# Patient Record
(demographics unavailable — no encounter records)

---

## 2017-07-28 NOTE — RADIOLOGY REPORT (SQ)
EXAM DESCRIPTION:  CHEST SINGLE VIEW



COMPLETED DATE/TIME:  7/28/2017 1:02 pm



REASON FOR STUDY:  CP



COMPARISON:  8/25/2016.



NUMBER OF VIEWS:  One view.



TECHNIQUE:  Single frontal radiographic view of the chest acquired.



LIMITATIONS:  None.



FINDINGS:  LUNGS AND PLEURA: No opacities, masses or pneumothorax.  No pleural effusion.

MEDIASTINUM AND HILAR STRUCTURES: No masses.  Contour normal.

HEART AND VASCULAR STRUCTURES: Heart enlarged without failure.  Normal vasculature.

BONES: No acute findings.

HARDWARE: Defibrillator.  Sternotomy wires and coronary bypass markers.

OTHER: No other significant finding.



IMPRESSION:  HEART ENLARGED WITHOUT FAILURE.  NO CHANGE.



TECHNICAL DOCUMENTATION:  JOB ID:  [id number]

 2011 [employer]- All Rights Reserved

## 2017-07-28 NOTE — EKG REPORT
SEVERITY:- ABNORMAL ECG -

SINUS TACHYCARDIA

VENTRICULAR PREMATURE COMPLEX

AMAIRANI, CONSIDER BIATRIAL ABNORMALITIES

RBBB AND LPFB

INFERIOR INFARCT, AGE INDETERMINATE

:

Confirmed by: Gail Gutierrez MD 28-Jul-2017 18:57:54

## 2017-07-28 NOTE — PDOC H&P
History of Present Illness


Admission Date/PCP: 


  07/28/17 12:15





  PREETI THOMSON MD





History of Present Illness: 


Patient 64-year-old male with a known history of ischemic cardiomyopathy 

chronic chronic systolic heart failure, coronary artery disease status post 

multiple stents insertion, CABG AICD pacemaker placement he came to the office 

today because of chest pain and  shortness of breath.He has no medical 

insurance and is not very compliant with care, in the office a 12-lead EKG was 

done it showed sinus rhythm with old inferior wall MI, right bundle branch block

, there is no change from 2015 EKG he was admitted directly to the hospital for 

management





Past Medical History


Cardiac Medical History: Reports: Congestive Heart Failure, Coronary Artery 

Disease, Myocardial Infarction - six, Hyperlipidema, Hypertension


Pulmonary Medical History: Reports: Asthma, Bronchitis, Chronic Obstructive 

Pulmonary Disease (COPD), Pneumonia


Neurological Medical History: 


   Denies: Seizures


Endocrine Medical History: Reports: Diabetes Mellitus Type 2


GI Medical History: Reports: Gastroesophageal Reflux Disease


Musculoskeltal Medical History: 


   Denies: Arthritis


Psychiatric Medical History: 


   Denies: Depression


Hematology: 


   Denies: Anemia





Past Surgical History


Past Surgical History: Reports: Cardiac Catheterization, Coronary Artery Bypass 

Graft - x 3, Coronary Stent - x 8, Pacemaker, Tonsillectomy





Social History


Smoking Status: Never Smoker


Frequency of Alcohol Use: None


Hx Recreational Drug Use: No


Hx Prescription Drug Abuse: No





- Advance Directive


Resuscitation Status: Full Code





Family History


Family History: Reviewed & Not Pertinent.  denies: CAD


Parental Family History Reviewed: Yes


Children Family History Reviewed: Yes


Sibling(s) Family History Reviewed.: Yes





Medication/Allergy


Home Medications: 








Albuterol Sulfate [Albuterol Sulfate 2.5mg/3 mL] 1 vial IH Q4 PRN 07/28/17 


Aspirin [Aspirin EC] 81 mg PO DAILY 07/28/17 


Carvedilol [Coreg 25 mg Tablet] 25 mg PO Q12 07/28/17 


Lisinopril [Prinivil 40 mg Tablet] 40 mg PO DAILY 07/28/17 


Nitroglycerin [Nitrostat] 0.4 mg SL PRN PRN 07/28/17 


Ranolazine [Ranexa] 1,000 mg PO BID 07/28/17 


Ticagrelor [Brilinta 90 mg Tablet] 90 mg PO BID 07/28/17 








Allergies/Adverse Reactions: 


 





Iodinated Contrast- Oral and IV Dye [IV Dye, Iodine Containing] Allergy (

Verified 08/15/15 06:29)


 











Review of Systems


Constitutional: PRESENT: as per HPI


Cardiovascular: PRESENT: chest pain, dyspnea on exertion, edema


Respiratory: PRESENT: dyspnea


Gastrointestinal: PRESENT: nausea


Neurological: PRESENT: weakness





Physical Exam


Vital Signs: 


 











Temp Pulse Resp BP Pulse Ox


 


 98.3 F   69   16   122/75   98 


 


 07/28/17 19:52  07/28/17 21:00  07/28/17 19:52  07/28/17 21:00  07/28/17 19:52








 Intake & Output











 07/27/17 07/28/17 07/29/17





 06:59 06:59 06:59


 


Intake Total   300


 


Output Total   600


 


Balance   -300


 


Weight   110.4 kg











General appearance: PRESENT: severe distress


Eye exam: PRESENT: PERRLA


Respiratory exam: PRESENT: other - Diminished air entry


Cardiovascular exam: PRESENT: +S1, +S2


GI/Abdominal exam: PRESENT: soft


Neurological exam: PRESENT: alert, CN II-XII grossly intact





Results


Laboratory Results: 


 





 07/28/17 12:50 





 07/28/17 12:50 





 











  07/28/17 07/28/17 07/28/17





  12:50 12:50 12:50


 


WBC  4.6  


 


RBC  6.27 H  


 


Hgb  17.8 H  


 


Hct  53.8 H  


 


MCV  86  


 


MCH  28.4  


 


MCHC  33.1  


 


RDW  14.7 H  


 


Plt Count  215  


 


Seg Neutrophils %  35.8 L  


 


Lymphocytes %  48.7 H  


 


Monocytes %  7.5  


 


Eosinophils %  6.9 H  


 


Basophils %  1.1  


 


Absolute Neutrophils  1.7  


 


Absolute Lymphocytes  2.3  


 


Absolute Monocytes  0.3  


 


Absolute Eosinophils  0.3  


 


Absolute Basophils  0.0  


 


Carbonic Acid   


 


HCO3/H2CO3 Ratio   


 


ABG pH   


 


ABG pCO2   


 


ABG pO2   


 


ABG HCO3   


 


ABG O2 Saturation   


 


ABG Base Excess   


 


FiO2   


 


Sodium   140.0 


 


Potassium   4.5 


 


Chloride   107 


 


Carbon Dioxide   21 L 


 


Anion Gap   12 


 


BUN   12 


 


Creatinine   1.31 H 


 


Est GFR ( Amer)   > 60 


 


Est GFR (Non-Af Amer)   55 L 


 


Glucose   99 


 


Calcium   9.4 


 


Total Bilirubin   0.9 


 


AST   24 


 


ALT   32 


 


Alkaline Phosphatase   73 


 


Total Protein   8.5 H 


 


Albumin   4.7 


 


TSH    2.40


 


Free T4    1.18














  07/28/17





  13:17


 


WBC 


 


RBC 


 


Hgb 


 


Hct 


 


MCV 


 


MCH 


 


MCHC 


 


RDW 


 


Plt Count 


 


Seg Neutrophils % 


 


Lymphocytes % 


 


Monocytes % 


 


Eosinophils % 


 


Basophils % 


 


Absolute Neutrophils 


 


Absolute Lymphocytes 


 


Absolute Monocytes 


 


Absolute Eosinophils 


 


Absolute Basophils 


 


Carbonic Acid  1.08


 


HCO3/H2CO3 Ratio  20:1


 


ABG pH  7.40


 


ABG pCO2  35.9


 


ABG pO2  100.8 H


 


ABG HCO3  21.8


 


ABG O2 Saturation  97.7


 


ABG Base Excess  -2.1


 


FiO2  4L NC


 


Sodium 


 


Potassium 


 


Chloride 


 


Carbon Dioxide 


 


Anion Gap 


 


BUN 


 


Creatinine 


 


Est GFR ( Amer) 


 


Est GFR (Non-Af Amer) 


 


Glucose 


 


Calcium 


 


Total Bilirubin 


 


AST 


 


ALT 


 


Alkaline Phosphatase 


 


Total Protein 


 


Albumin 


 


TSH 


 


Free T4 








 











  07/28/17 07/28/17 07/28/17





  12:50 12:50 12:50


 


Creatine Kinase  192 H  188 H 


 


CK-MB (CK-2)    2.83


 


Troponin I    0.027














  07/28/17 07/28/17





  20:30 20:30


 


Creatine Kinase  147 


 


CK-MB (CK-2)   2.75


 


Troponin I   0.131











Impressions: 


 





Chest X-Ray  07/28/17 00:00


IMPRESSION:  HEART ENLARGED WITHOUT FAILURE.  NO CHANGE.


 














Assessment & Plan





- Diagnosis


(1) Hypertensive emergency


Plan: 


The blood pressure is severely elevated with evidence of end organ failure, he 

will be started on IV nitroglycerin infusion








(2) Chest pain


Qualifiers: 


     Chest pain type: unspecified     Qualified Code(s): R07.9 - Chest pain, 

unspecified  


Is this a current diagnosis for this admission?: Yes





(3) Coronary artery disease


Qualifiers: 


     Coronary Disease-Associated Artery/Lesion type: unspecified vessel or 

lesion type     Associated angina: angina presence unspecified


Is this a current diagnosis for this admission?: Yes





(4) Type 2 diabetes mellitus


Qualifiers: 


     Diabetes mellitus complication status: with unspecified complications     

Diabetes mellitus long term insulin use: without long term use        Qualified 

Code(s): E11.8 - Type 2 diabetes mellitus with unspecified complications; Z79.4 

- Long term (current) use of insulin  


Is this a current diagnosis for this admission?: Yes

## 2017-07-28 NOTE — XCELERA REPORT
[address] [address]

                             Tel: [phone]

                             Fax: [phone]



                    Transthoracic Echocardiogram Report

____________________________________________________________________________



Name: FRIDA YAP JR

MRN: [id number]                Age: 64 yrs

Gender: Male                   : 1953

Patient Status: Inpatient      Patient Location: 3S\S\336\S\A

Account #: [id number]

Study Date: 2017 03:16 PM

Accession #: [id number]

____________________________________________________________________________



Height: 73 in        Weight: 243 lb        BSA: 2.3 m2



____________________________________________________________________________

Procedure: A complete two-dimensional transthoracic echocardiogram was

performed (2D, M-mode, spectral and color flow Doppler). The study was

technically difficult with many images being suboptimal in quality.

Reason For Study: CP, severe HTN





Ordering Physician: PATRICE ISBELL

Performed By: Alda Cortes

____________________________________________________________________________





Interpretation Summary

The Ejection Fraction estimate is 25-30%

Left ventricular systolic function is severely reduced.

There is moderate concentric left ventricular hypertrophy.

The left ventricle is mildly dilated.

Doppler measurements suggest pseudonormalized left ventricular relaxation,

which is associated with grade II/IV or mild to moderate diastolic

dysfunction

There is severe global hypokinesis of the left ventricle.

There is mid to distal anterior wall akinesis

There is apical wall akinesis

The right atrium is mildly dilated.

The left atrium is severely dilated.

There is a trace amount of mitral regurgitation

There is no mitral valve stenosis.

No aortic regurgitation is present.

There is no aortic valve stenosis

There is a trace or physiologic amount of tricuspid regurgitation

Tricuspid regurgitation jet envelope not well defined to measure RV

systolic pressure accurately.

The aortic root is not well visualized but is probably normal size.

The inferior vena cava appeared normal and decreased > 50% with respiration

(RAP 5-10 mmHg)

There is no pericardial effusion.



____________________________________________________________________________



MMode/2D Measurements \T\ Calculations

RVDd: 3.1 cm     LVIDd: 6.1 cm       FS: 7.3 %         Ao root diam:

IVSd: 1.5 cm     LVIDs: 5.7 cm       EDV(Teich):       3.1 cm

                 LVPWd: 1.3 cm       188.6 ml          Ao root area:

                                     ESV(Teich):

                                     158.4 ml          7.5 cm2

                                     EF(Teich): 16.0 % LA dimension:

                                                       5.3 cm

        _____________________________________________________________

LVOT diam:       LVLd ap4: 10.7 cm   SV(MOD-sp4):

2.4 cm           EDV(MOD-sp4):       77.0 ml

LVOT area:       240.0 ml

                 LVLs ap4: 9.9 cm

4.6 cm2          ESV(MOD-sp4):

                 163.0 ml

                 EF(MOD-sp4): 32.1 %





Doppler Measurements \T\ Calculations

MV E max flor:      MV P1/2t max flor:    Ao V2 max:        LV V1 max P.2 cm/sec        80.6 cm/sec          129.0 cm/sec      1.8 mmHg

MV A max flor:      MV P1/2t: 45.6 msec  Ao max PG:        LV V1 max:

38.9 cm/sec        MVA(P1/2t): 4.8 cm2  6.7 mmHg          67.5 cm/sec

MV E/A: 2.1        MV dec slope:        RUY(V,D): 2.4 cm2

                   517.7 cm/sec2



        _____________________________________________________________

PA V2 max:

73.1 cm/sec

PA max P.1 mmHg



____________________________________________________________________________

Left Ventricle

The left ventricle is mildly dilated. There is moderate concentric left

ventricular hypertrophy. Left ventricular systolic function is severely

reduced. The Ejection Fraction estimate is 25-30%. Doppler measurements

suggest pseudonormalized left ventricular relaxation, which is associated

with grade II/IV or mild to moderate diastolic dysfunction. There is severe

global hypokinesis of the left ventricle. There is mid to distal anterior

wall akinesis. There is apical wall akinesis.



Right Ventricle

The right ventricle is grossly normal size. There is normal right

ventricular wall thickness. The right ventricular systolic function is

normal.



Atria

The right atrium is mildly dilated. The left atrium is severely dilated.





Mitral Valve

The mitral valve is grossly normal. There is no mitral valve stenosis.

There is a trace amount of mitral regurgitation.



Aortic Valve

The aortic valve is grossly normal. There is no aortic valve stenosis. No

aortic regurgitation is present.



Tricuspid Valve

The tricuspid valve is not well visualized, but is grossly normal. There is

no tricuspid stenosis. There is a trace or physiologic amount of tricuspid

regurgitation. Tricuspid regurgitation jet envelope not well defined to

measure RV systolic pressure accurately.



Pulmonic Valve

The pulmonic valve is not well visualized.



Great Vessels

The aortic root is not well visualized but is probably normal size. The

inferior vena cava appeared normal and decreased > 50% with respiration

(RAP 5-10 mmHg).





Effusions

There is no pericardial effusion.



Incidental Findings

Pacemaker wire noted.



____________________________________________________________________________



Electronically signed by:      Patrice Isbell      on 2017 07:34 PM



CC: PATRICE ISBELL

>

Patrice Isbell

## 2017-07-29 NOTE — PDOC PROGRESS REPORT
Subjective


Progress Note for:: 07/29/17


Subjective:: 


Patient seems to be doing better with gradual improvement.  Pt is denying any 

chest arm or neck discomfort.  Patient denying any PND, orthopnea.  Patient 

denied any sustained palpitations, dizziness, syncope, near syncope.  Patient 

denying any fever chills.  Patient denying any other significant discomfort.  





Patient is maintaining sinus rhythm with ventricular paced beats.





Review of systems: Rest review of systems negative.





Medications: Medications have been reviewed.





Physical Exam


Vital Signs: 


 











Temp Pulse Resp BP Pulse Ox


 


 98.0 F   79   16   122/79   100 


 


 07/29/17 08:11  07/29/17 08:11  07/29/17 08:11  07/29/17 08:11  07/29/17 08:11








 Intake & Output











 07/28/17 07/29/17 07/30/17





 06:59 06:59 06:59


 


Intake Total  927 


 


Output Total  600 


 


Balance  327 


 


Weight  109.3 kg 














Results


Laboratory Results: 


 





 07/28/17 12:50 





 07/28/17 12:50 





 











  07/28/17 07/28/17 07/28/17





  12:50 12:50 12:50


 


Creatine Kinase  192 H  188 H 


 


CK-MB (CK-2)    2.83


 


Troponin I    0.027














  07/28/17 07/28/17 07/29/17





  20:30 20:30 05:00


 


Creatine Kinase  147   159


 


CK-MB (CK-2)   2.75 


 


Troponin I   0.131 














  07/29/17





  05:00


 


Creatine Kinase 


 


CK-MB (CK-2)  2.48


 


Troponin I  0.098











EKG Comments: 


Showed sinus rhythm with right bundle branch block pattern, evidence of prior 

inferior myocardial infarction occasional VPCs noted


Impressions: 


 





Chest X-Ray  07/28/17 00:00


IMPRESSION:  HEART ENLARGED WITHOUT FAILURE.  NO CHANGE.


 














Assessment & Plan





- Diagnosis


(1) Hypertensive emergency


Is this a current diagnosis for this admission?: Yes





(2) Chest pain


Qualifiers: 


     Chest pain type: unspecified     Qualified Code(s): R07.9 - Chest pain, 

unspecified  


Is this a current diagnosis for this admission?: Yes





(3) Coronary artery disease


Qualifiers: 


     Coronary Disease-Associated Artery/Lesion type: unspecified vessel or 

lesion type     Associated angina: angina presence unspecified


Is this a current diagnosis for this admission?: Yes





(4) Type 2 diabetes mellitus


Qualifiers: 


     Diabetes mellitus complication status: with unspecified complications     

Diabetes mellitus long term insulin use: without long term use        Qualified 

Code(s): E11.8 - Type 2 diabetes mellitus with unspecified complications; Z79.4 

- Long term (current) use of insulin  


Is this a current diagnosis for this admission?: Yes





(5) Cardiac defibrillator in situ


Is this a current diagnosis for this admission?: Yes





(6) Congestive heart failure


Qualifiers: 


     Congestive heart failure type: combined     Congestive heart failure 

chronicity: acute on chronic        Qualified Code(s): I50.43 - Acute on 

chronic combined systolic (congestive) and diastolic (congestive) heart failure

  


Is this a current diagnosis for this admission?: Yes








- Notes


Notes: 


Hypertensive emergency: Patient was noted to have severe hypertension on 

presentation along with precipitation of chest pain.  Currently blood pressure 

under satisfactory control.


Coronary artery disease: Patient has significant CAD and is status post CABG 

and also multiple stent placement.  Patient's medical regimen will be 

optimized.  Patient will benefit from a nuclear stress test prior to discharge 

and this will be scheduled.  This is needed because of presentation with chest 

pain and subsequent positive cardiac enzymes elevation.


Congestive heart failure: This seems clinically compensated by exam.  This is 

related to systolic plus diastolic dysfunction.  Echocardiogram shows systolic 

dysfunction as well as diastolic dysfunction without any significant valvular 

abnormalities..  Will recommend entresto therapy, beta-blocker therapy and 

diuretics therapy.  Patient will also benefit from salt and fluid restriction.  

Entresto therapy not to be started at this time as there is some concern about 

affordability by the patient.


Status post defibrillator placement: Patient claims defibrillator was placed in 

[place].  Last ICD battery change out was 3 years ago.  Currently 

defibrillator seems to be functioning normally.


Chest pain: Currently chest pain-free.  Patient subsequently noted to have high 

troponin I.  Patient will benefit from a ischemia workup.  Chest pain probably 

precipitated by severe hypertension.


Non-STEMI: Based on enzyme elevation and symptoms of chest discomfort patient 

seems to have sustained non-STEMI.  Could have been precipitated by severe 

hypertension.


Diabetes: Recommend good control of blood sugar.  However should avoid any 

hypoglycemia.  Patient being expertly managed by primary care M.D.





- Time


Time with patient: Greater than 35 minutes - CODE STATUS was discussed, patient 

remains full code.  Surrogate decision-maker unchanged.  Multiple medical 

problems were addressed.  More than 50% of the time spent coordinating care, 

discussing management plans with involved caregivers.  Management plans 

discussed with involved personnels.  Medical decision making was of moderate to 

high complexity, patient's has multiple  comorbidities.


Medications reviewed and adjusted accordingly: Yes

## 2017-07-29 NOTE — PDOC CONSULTATION
Consultation


Consult Date: 07/28/17


Attending physician:: PREETI THOMSON


Consult reason:: Chest discomfort and shortness of breath





History of Present Illness


Admission Date/PCP: 


  07/28/17 12:15





  PREETI THOMSON MD





Patient complains of: Shortness of breath and chest discomfort


History of Present Illness: 


Deidre is a 63-year-old male with past medical history of coronary artery 

disease and ischemic cardiomyopathy as well as hypertension, diabetes, and 

hyperlipidemia who presents with several hours of left-sided chest pain.  He 

described as a dull, throbbing, aching pain.  It was improved and eventually 

relieved by multiple doses of nitroglycerin.  Nothing was noted to worsen the 

pain when it was present.  He was admitted directly from the office primary 

care MD with above complaint.  He was also noted to have severe hypertension 

with systolic over 200 mmHg.  Patient was started on nitroglycerin sublingual 

and subsequently on nitroglycerin drip.  I entered multiple antihypertensive 

medication to be given.  Please see orders.  He notes associated dyspnea as 

well as nausea without vomiting. 








Past Medical History


Cardiac Medical History: Reports: Congestive Heart Failure, Coronary Artery 

Disease, Myocardial Infarction - six, Hyperlipidema, Hypertension


Pulmonary Medical History: Reports: Asthma, Bronchitis, Chronic Obstructive 

Pulmonary Disease (COPD), Pneumonia


Neurological Medical History: 


   Denies: Seizures


Endocrine Medical History: Reports: Diabetes Mellitus Type 2


GI Medical History: Reports: Gastroesophageal Reflux Disease


Musculoskeltal Medical History: 


   Denies: Arthritis


Psychiatric Medical History: 


   Denies: Depression


Hematology: 


   Denies: Anemia





Past Surgical History


Past Surgical History: Reports: Cardiac Catheterization, Coronary Artery Bypass 

Graft - x 3, Coronary Stent - x 8, Pacemaker, Tonsillectomy





Social History


Information Source: Patient


Smoking Status: Never Smoker


Frequency of Alcohol Use: None


Hx Recreational Drug Use: No


Hx Prescription Drug Abuse: No





- Advance Directive


Resuscitation Status: Full Code


Surrogate healthcare decision maker:: 


Patient spouse is the surrogate decision-maker





Family History


Family History: CAD, Hypertension


Parental Family History Reviewed: Yes


Children Family History Reviewed: Yes


Sibling(s) Family History Reviewed.: Yes





Medication/Allergy


Home Medications: 








Albuterol Sulfate [Albuterol Sulfate 2.5mg/3 mL] 1 vial IH Q4 PRN 07/28/17 


Aspirin [Aspirin EC] 81 mg PO DAILY 07/28/17 


Carvedilol [Coreg 25 mg Tablet] 25 mg PO Q12 07/28/17 


Lisinopril [Prinivil 40 mg Tablet] 40 mg PO DAILY 07/28/17 


Nitroglycerin [Nitrostat] 0.4 mg SL PRN PRN 07/28/17 


Ranolazine [Ranexa] 1,000 mg PO BID 07/28/17 


Ticagrelor [Brilinta 90 mg Tablet] 90 mg PO BID 07/28/17 








Allergies/Adverse Reactions: 


 





Iodinated Contrast- Oral and IV Dye [IV Dye, Iodine Containing] Allergy (

Verified 08/15/15 06:29)


 











Review of Systems


Review of Systems: 


Please see history of present illness and past medical history as wall.


Constitutional: No fever or chills reported.


Head : No recent chronic headaches, recent head injury.


Eyes: No recent eye pain, diplopia, redness, discharge, acute visual changes.


Ears: No recent chronic ear pain, acute hearing loss, ear discharge.


Oral cavity: No recent  ulcerations, bleeding, oral cavity discomfort.


Neck: No recent acute neck pain reported.


Hematologic: No recent easy bruising or bleeding or hematologic malignancy 

reported.


Lymphatic: No recent lymphatic malignancy, chronic lymphadenopathy reported yet


Cardiovascular system review: See history of present illness.


Respiratory system review: No recent chronic cough, hemoptysis, blood clots in 

the lungs reported. Mild Shortness of breath on exertion


Gastrointestinal system review: Negative for any recent acute or chronic 

abdominal pain, hematemesis, melena, recent change in bowel habits.  


Genitourinary system review: No recent acute or chronic hematuria, flank pain, 

UTI etc. reported.


Skin system review: Negative for any recent abnormal bruising, no rash, no 

pruritus reported.


Neurologic: No prior history of strokes, mini strokes, seizure disorder.


Psychologic: No history of major psychosis or major depression reported.


Musculoskeletal: Minor aches and pains reported.  No acute joint swelling 

reported.


Endocrine: No recent polyuria, polydipsia, recent heat or cold intolerance.





Physical Exam


Vital Signs: 


 











Temp Pulse Resp BP Pulse Ox


 


 98.2 F   76   22 H  177/105 H  98 


 


 07/28/17 14:00  07/28/17 16:18  07/28/17 14:00  07/28/17 16:18  07/28/17 14:00








 Intake & Output











 07/27/17 07/28/17 07/29/17





 06:59 06:59 06:59


 


Intake Total   300


 


Output Total   600


 


Balance   -300


 


Weight   110.4 kg











Exam: 





GENERAL: well-nourished and in no acute distress.  Alert and oriented x3


HEAD: Atraumatic, normocephalic.


EYES: Pupils equal round and reactive to light, extraocular movements intact, 

sclera anicteric, conjunctiva are normal.


ENT: TMs normal, nares patent, oropharynx clear without exudates. Moist mucous 

membranes.  No oral ulcerations or bleeding gums noted


NECK: supple without lymphadenopathy.  Trachea is central.  No cervical or 

axillary lymphadenopathy noted.  Carotids are 2+, JVD WNL 


LUNGS: Respiration seems nonlabored, no significant accessory muscle action 

noted.  Bilateral fine basal crackles noted. No wheezes rales or rhonchi noted.

  No significant dullness noted on percussion.


CHEST: Palpation of the chest wall shows no significant chest wall tenderness.  

No other significant abnormalities noted.


HEART: [place] NP, No PSH,  1/6 ROSA aortic area, 1/6 pan systolic murmur mitral 

area, no rubs, positive S3 gallops.


ABDOMEN: Soft, no significant tenderness appreciated, normoactive bowel sounds. 

No guarding, no rebound.  No rigidity noted . No masses appreciated.


EXTREMITIES: Pedal pulses are 1-2+, no calf tenderness noted. No clubbing or 

cyanosis.trace to 1+ pedal edema noted


NEUROLOGICAL: Focused neurological exam showed no significant neurologic 

deficit. Normal speech, no focal weakness appreciated. 


PSYCH: Normal mood, normal affect.  Judgment and insight within normal limits.


SKIN: No significant ecchymosis, rash, ulcerations or signs of pruritus noted.


MUSCULOSKELETAL EXAM: No significant joint swelling noted.





Results


Laboratory Results: 


 





 07/28/17 12:50 





 07/28/17 12:50 





 











  07/28/17 07/28/17 07/28/17





  12:50 12:50 12:50


 


WBC  4.6  


 


RBC  6.27 H  


 


Hgb  17.8 H  


 


Hct  53.8 H  


 


MCV  86  


 


MCH  28.4  


 


MCHC  33.1  


 


RDW  14.7 H  


 


Plt Count  215  


 


Seg Neutrophils %  35.8 L  


 


Lymphocytes %  48.7 H  


 


Monocytes %  7.5  


 


Eosinophils %  6.9 H  


 


Basophils %  1.1  


 


Absolute Neutrophils  1.7  


 


Absolute Lymphocytes  2.3  


 


Absolute Monocytes  0.3  


 


Absolute Eosinophils  0.3  


 


Absolute Basophils  0.0  


 


Carbonic Acid   


 


HCO3/H2CO3 Ratio   


 


ABG pH   


 


ABG pCO2   


 


ABG pO2   


 


ABG HCO3   


 


ABG O2 Saturation   


 


ABG Base Excess   


 


FiO2   


 


Sodium   140.0 


 


Potassium   4.5 


 


Chloride   107 


 


Carbon Dioxide   21 L 


 


Anion Gap   12 


 


BUN   12 


 


Creatinine   1.31 H 


 


Est GFR ( Amer)   > 60 


 


Est GFR (Non-Af Amer)   55 L 


 


Glucose   99 


 


Calcium   9.4 


 


Total Bilirubin   0.9 


 


AST   24 


 


ALT   32 


 


Alkaline Phosphatase   73 


 


Total Protein   8.5 H 


 


Albumin   4.7 


 


TSH    2.40


 


Free T4    1.18














  07/28/17





  13:17


 


WBC 


 


RBC 


 


Hgb 


 


Hct 


 


MCV 


 


MCH 


 


MCHC 


 


RDW 


 


Plt Count 


 


Seg Neutrophils % 


 


Lymphocytes % 


 


Monocytes % 


 


Eosinophils % 


 


Basophils % 


 


Absolute Neutrophils 


 


Absolute Lymphocytes 


 


Absolute Monocytes 


 


Absolute Eosinophils 


 


Absolute Basophils 


 


Carbonic Acid  1.08


 


HCO3/H2CO3 Ratio  20:1


 


ABG pH  7.40


 


ABG pCO2  35.9


 


ABG pO2  100.8 H


 


ABG HCO3  21.8


 


ABG O2 Saturation  97.7


 


ABG Base Excess  -2.1


 


FiO2  4L NC


 


Sodium 


 


Potassium 


 


Chloride 


 


Carbon Dioxide 


 


Anion Gap 


 


BUN 


 


Creatinine 


 


Est GFR ( Amer) 


 


Est GFR (Non-Af Amer) 


 


Glucose 


 


Calcium 


 


Total Bilirubin 


 


AST 


 


ALT 


 


Alkaline Phosphatase 


 


Total Protein 


 


Albumin 


 


TSH 


 


Free T4 








 











  07/28/17 07/28/17 07/28/17





  12:50 12:50 12:50


 


Creatine Kinase  192 H  188 H 


 


CK-MB (CK-2)    2.83


 


Troponin I    0.027











EKG Comments: 


Twelve-lead EKG shows sinus rhythm, right bundle branch block pattern, inferior 

Q waves suggestive of prior inferior myocardial infarction and occasional VPCs.

  EKG is felt to be relatively unchanged.


Impressions: 


 





Chest X-Ray  07/28/17 00:00


IMPRESSION:  HEART ENLARGED WITHOUT FAILURE.  NO CHANGE.


 














Assessment & Plan





- Diagnosis


(1) Hypertensive emergency


Is this a current diagnosis for this admission?: Yes





(2) Chest pain


Qualifiers: 


     Chest pain type: unspecified     Qualified Code(s): R07.9 - Chest pain, 

unspecified  


Is this a current diagnosis for this admission?: Yes





(3) Coronary artery disease


Qualifiers: 


     Coronary Disease-Associated Artery/Lesion type: unspecified vessel or 

lesion type     Associated angina: angina presence unspecified


Is this a current diagnosis for this admission?: Yes





(4) Type 2 diabetes mellitus


Qualifiers: 


     Diabetes mellitus complication status: with unspecified complications     

Diabetes mellitus long term insulin use: without long term use        Qualified 

Code(s): E11.8 - Type 2 diabetes mellitus with unspecified complications; Z79.4 

- Long term (current) use of insulin  


Is this a current diagnosis for this admission?: Yes





(5) Cardiac defibrillator in situ


Is this a current diagnosis for this admission?: Yes





(6) Congestive heart failure


Qualifiers: 


     Congestive heart failure type: combined     Congestive heart failure 

chronicity: acute on chronic        Qualified Code(s): I50.43 - Acute on 

chronic combined systolic (congestive) and diastolic (congestive) heart failure

  


Is this a current diagnosis for this admission?: Yes








- Notes


Notes: 


Hypertensive emergency: Patient was noted to have severe hypertension along 

with precipitation of chest pain.  Patient placed on nitroglycerin drip.  

Multiple antihypertensives added.  After several hours, blood pressure came to 

the normal range.  Nitroglycerin drip was ordered to be discontinued.


Coronary artery disease: Patient has significant CAD and is status post CABG 

and also multiple stent placement.  Patient's medical regimen will be 

optimized.  Patient will benefit from a nuclear stress test prior to discharge 

and this will be scheduled.  This is needed because of presentation with chest 

pain and subsequent positive cardiac enzymes elevation.


Congestive heart failure: This is related to systolic plus diastolic 

dysfunction.  A stat echo performed was reviewed.  Will recommend entresto 

therapy, beta-blocker therapy and diuretics therapy.  Patient will also benefit 

from salt and fluid restriction.


Status post defibrillator placement: Patient claims defibrillator was placed in 

[place].  Last ICD battery change out was 3 years ago.  Currently 

defibrillator seems to be functioning normally.


Chest pain: Patient subsequently noted to have high troponin I.  Patient will 

benefit from a ischemia workup.  Chest pain probably precipitated by severe 

hypertension.


Non-STEMI: Based on enzyme elevation and symptoms of chest discomfort patient 

seems to have sustained non-STEMI.  Could have been precipitated by severe 

hypertension.


Diabetes: Recommend good control of blood sugar.  However should avoid any 

hypoglycemia.  Patient being expertly managed by primary care M.D.





- Time


Time Spent: 50 to 70 Minutes - CODE STATUS was discussed, patient remains full 

code.  Surrogate decision-maker unchanged.  Multiple medical problems were 

addressed.  More than 50% of the time spent coordinating care, discussing 

management plans with involved caregivers.  Management plans discussed with 

involved personnels.  Medical decision making was of moderate to high complexity

, patient's has multiple  comorbidities.


Medications reviewed and adjusted accordingly: Yes

## 2017-07-29 NOTE — PDOC PROGRESS REPORT
Subjective


Progress Note for:: 07/29/17


Subjective:: 


No chest pain or difficulty with breathing. No dizziness, headache, nausea or 

vomiting. No fever or chills.





Physical Exam


Vital Signs: 


 











Temp Pulse Resp BP Pulse Ox


 


 98.0 F   79   16   122/79   100 


 


 07/29/17 08:11  07/29/17 08:11  07/29/17 08:11  07/29/17 08:11  07/29/17 08:11








 Intake & Output











 07/28/17 07/29/17 07/30/17





 06:59 06:59 06:59


 


Intake Total  927 


 


Output Total  600 


 


Balance  327 


 


Weight  109.3 kg 











General appearance: PRESENT: no acute distress, cooperative


Head exam: PRESENT: atraumatic, normocephalic


Eye exam: PRESENT: EOMI, PERRLA


Mouth exam: PRESENT: moist


Neck exam: PRESENT: full ROM.  ABSENT: carotid bruit, JVD, lymphadenopathy, 

thyromegaly


Respiratory exam: PRESENT: clear to auscultation nanda


Cardiovascular exam: PRESENT: RRR.  ABSENT: diastolic murmur, rubs, systolic 

murmur


GI/Abdominal exam: PRESENT: normal bowel sounds, soft.  ABSENT: distended, 

guarding, mass, organolmegaly, rebound, tenderness


Extremities exam: ABSENT: pedal edema


Musculoskeletal exam: PRESENT: deformity - related to joint arthritis 

involvement


Neurological exam: PRESENT: alert, awake, oriented to person, oriented to place

, oriented to time, oriented to situation, CN II-XII grossly intact.  ABSENT: 

motor sensory deficit


Psychiatric exam: PRESENT: appropriate affect, normal mood.  ABSENT: homicidal 

ideation, suicidal ideation


Skin exam: PRESENT: dry, intact, warm.  ABSENT: cyanosis, rash





Results


Laboratory Results: 


 





 07/28/17 12:50 





 07/28/17 12:50 





 











  07/28/17 07/28/17 07/28/17





  12:50 12:50 13:17


 


WBC  4.6  


 


RBC  6.27 H  


 


Hgb  17.8 H  


 


Hct  53.8 H  


 


MCV  86  


 


MCH  28.4  


 


MCHC  33.1  


 


RDW  14.7 H  


 


Plt Count  215  


 


Seg Neutrophils %  35.8 L  


 


Lymphocytes %  48.7 H  


 


Monocytes %  7.5  


 


Eosinophils %  6.9 H  


 


Basophils %  1.1  


 


Absolute Neutrophils  1.7  


 


Absolute Lymphocytes  2.3  


 


Absolute Monocytes  0.3  


 


Absolute Eosinophils  0.3  


 


Absolute Basophils  0.0  


 


Carbonic Acid    1.08


 


HCO3/H2CO3 Ratio    20:1


 


ABG pH    7.40


 


ABG pCO2    35.9


 


ABG pO2    100.8 H


 


ABG HCO3    21.8


 


ABG O2 Saturation    97.7


 


ABG Base Excess    -2.1


 


FiO2    4L NC


 


TSH   2.40 


 


Free T4   1.18 








 











  07/28/17 07/28/17 07/28/17





  12:50 12:50 12:50


 


Creatine Kinase  192 H  188 H 


 


CK-MB (CK-2)    2.83


 


Troponin I    0.027














  07/28/17 07/28/17 07/29/17





  20:30 20:30 05:00


 


Creatine Kinase  147   159


 


CK-MB (CK-2)   2.75 


 


Troponin I   0.131 














  07/29/17





  05:00


 


Creatine Kinase 


 


CK-MB (CK-2)  2.48


 


Troponin I  0.098











Impressions: 


 





Chest X-Ray  07/28/17 00:00


IMPRESSION:  HEART ENLARGED WITHOUT FAILURE.  NO CHANGE.


 














Assessment & Plan





- Diagnosis


(1) Chest pain


Qualifiers: 


     Chest pain type: unspecified     Qualified Code(s): R07.9 - Chest pain, 

unspecified  


Is this a current diagnosis for this admission?: YesPlan: 


See covering attending physician orders.








(2) Hypertensive emergency


Is this a current diagnosis for this admission?: YesPlan: 


See covering attending physician orders.








(3) Coronary artery disease


Qualifiers: 


     Coronary Disease-Associated Artery/Lesion type: unspecified vessel or 

lesion type     Associated angina: angina presence unspecified


Is this a current diagnosis for this admission?: YesPlan: 


See covering attending physician orders.








(4) Type 2 diabetes mellitus


Qualifiers: 


     Diabetes mellitus complication status: with unspecified complications     

Diabetes mellitus long term insulin use: without long term use        Qualified 

Code(s): E11.8 - Type 2 diabetes mellitus with unspecified complications  


Is this a current diagnosis for this admission?: YesPlan: 


See covering attending physician orders.











- Time


Time Spent with patient: 25-34 minutes


Medications reviewed and adjusted accordingly: Yes


Anticipated discharge: Home


Within: Other





- Inpatient Certification


Medical Necessity: Need Close Monitoring Due to Risk of Patient Decompensation, 

Need For IV Fluids, Need For Continuous Telemetry Monitoring, Risk of 

Complication if Not Cared For in Hospital


Post Hospital Care: D/C Planner Documentation





- Plan Summary


Plan Summary: 


See covering attending physician orders.

## 2017-07-30 NOTE — PDOC PROGRESS REPORT
Subjective


Progress Note for:: 07/30/17


Subjective:: 


No chest pain or difficulty with breathing. No dizziness, headache, nausea or 

vomiting. No fever or chills.





Physical Exam


Vital Signs: 


 











Temp Pulse Resp BP Pulse Ox


 


 98.0 F   63   16   139/73 H  100 


 


 07/30/17 11:40  07/30/17 11:40  07/30/17 11:40  07/30/17 11:40  07/30/17 11:40








 Intake & Output











 07/29/17 07/30/17 07/31/17





 06:59 06:59 06:59


 


Intake Total 927 1080 350


 


Output Total 600  


 


Balance 327 1080 350


 


Weight 109.3 kg 107.7 kg 











Physical Exam: 


General appearance: PRESENT: no acute distress, cooperative


Head exam: PRESENT: atraumatic, normocephalic


Eye exam: PRESENT: EOMI, PERRLA


Mouth exam: PRESENT: moist


Neck exam: PRESENT: full ROM.  ABSENT: carotid bruit, JVD, lymphadenopathy, 

thyromegaly


Respiratory exam: PRESENT: clear to auscultation nanda


Cardiovascular exam: PRESENT: RRR.  ABSENT: diastolic murmur, rubs, systolic 

murmur


GI/Abdominal exam: PRESENT: normal bowel sounds, soft.  ABSENT: distended, 

guarding, mass, organomegaly, rebound, tenderness


Extremities exam: ABSENT: pedal edema


Musculoskeletal exam: PRESENT: deformity - related to joint arthritis 

involvement


Neurological exam: PRESENT: alert, awake, oriented to person, oriented to place

, oriented to time, oriented to situation, CN II-XII grossly intact.  ABSENT: 

motor sensory deficit


Psychiatric exam: PRESENT: appropriate affect, normal mood.  ABSENT: homicidal 

ideation, suicidal ideation


Skin exam: PRESENT: dry, intact, warm.  ABSENT: cyanosis, rash








Results


Laboratory Results: 


 





 07/30/17 06:02 





 07/30/17 06:02 





 











  07/30/17 07/30/17





  06:02 06:02


 


WBC  5.1 


 


RBC  5.69 H 


 


Hgb  16.3 


 


Hct  49.3 


 


MCV  87 


 


MCH  28.7 


 


MCHC  33.1 


 


RDW  14.5 H 


 


Plt Count  164 


 


Seg Neutrophils %  53.6 


 


Lymphocytes %  29.4 


 


Monocytes %  11.0 


 


Eosinophils %  4.8 


 


Basophils %  1.2 


 


Absolute Neutrophils  2.7 


 


Absolute Lymphocytes  1.5 


 


Absolute Monocytes  0.6 


 


Absolute Eosinophils  0.2 


 


Absolute Basophils  0.1 


 


Sodium   142.1


 


Potassium   4.3


 


Chloride   107


 


Carbon Dioxide   23


 


Anion Gap   12


 


BUN   19


 


Creatinine   1.46 H


 


Est GFR ( Amer)   59 L


 


Est GFR (Non-Af Amer)   49 L


 


Glucose   102


 


Calcium   9.4


 


Triglycerides   124


 


Cholesterol   228.20 H


 


LDL Cholesterol Direct   159 H


 


VLDL Cholesterol   25.0


 


HDL Cholesterol   46








 











  07/28/17 07/28/17 07/28/17





  12:50 12:50 12:50


 


Creatine Kinase  192 H  188 H 


 


CK-MB (CK-2)    2.83


 


Troponin I    0.027














  07/28/17 07/28/17 07/29/17





  20:30 20:30 05:00


 


Creatine Kinase  147   159


 


CK-MB (CK-2)   2.75 


 


Troponin I   0.131 














  07/29/17 07/30/17 07/30/17





  05:00 06:02 06:02


 


Creatine Kinase   116 


 


CK-MB (CK-2)  2.48   1.75


 


Troponin I  0.098   0.071











Impressions: 


 





Chest X-Ray  07/28/17 00:00


IMPRESSION:  HEART ENLARGED WITHOUT FAILURE.  NO CHANGE.


 














Assessment & Plan





- Diagnosis


(1) Chest pain


Qualifiers: 


     Chest pain type: unspecified     Qualified Code(s): R07.9 - Chest pain, 

unspecified  


Is this a current diagnosis for this admission?: Yes





(2) Hypertensive emergency


Is this a current diagnosis for this admission?: Yes





(3) Coronary artery disease


Qualifiers: 


     Coronary Disease-Associated Artery/Lesion type: unspecified vessel or 

lesion type     Associated angina: angina presence unspecified


Is this a current diagnosis for this admission?: Yes





(4) Type 2 diabetes mellitus


Qualifiers: 


     Diabetes mellitus complication status: with unspecified complications     

Diabetes mellitus long term insulin use: without long term use        Qualified 

Code(s): E11.8 - Type 2 diabetes mellitus with unspecified complications; Z79.4 

- Long term (current) use of insulin  


Is this a current diagnosis for this admission?: Yes





(5) NSTEMI (non-ST elevated myocardial infarction)


Is this a current diagnosis for this admission?: YesPlan: 


In view of his elevated above cut off level Troponin I. Patient is schedule for 

pharmacologic stress test tomorrow. Continue all current medication management.











- Time


Time Spent with patient: 25-34 minutes


Medications reviewed and adjusted accordingly: Yes


Anticipated discharge: Home


Within: Other





- Inpatient Certification


Medical Necessity: Need Close Monitoring Due to Risk of Patient Decompensation, 

Need For Continuous Telemetry Monitoring, Risk of Complication if Not Cared For 

in Hospital


Post Hospital Care: D/C Planner Documentation





- Plan Summary


Plan Summary: 


See covering attending physician orders.

## 2017-07-30 NOTE — PDOC PROGRESS REPORT
Subjective


Progress Note for:: 07/30/17


Subjective:: 


Patient seems to be doing better with gradual improvement.  Pt is denying any 

chest arm or neck discomfort.  Patient denying any PND, orthopnea.  Patient 

denied any sustained palpitations, dizziness, syncope, near syncope.  Patient 

denying any fever chills.  Patient denying any other significant discomfort.  





Patient is maintaining sinus rhythm with ventricular paced beats.





Review of systems: Rest review of systems negative.





Medications: Medications have been reviewed.





Physical Exam


Vital Signs: 


 











Temp Pulse Resp BP Pulse Ox


 


 98.0 F   63   16   139/73 H  100 


 


 07/30/17 11:40  07/30/17 11:40  07/30/17 11:40  07/30/17 11:40  07/30/17 11:40








 Intake & Output











 07/29/17 07/30/17 07/31/17





 06:59 06:59 06:59


 


Intake Total 927 1080 350


 


Output Total 600  


 


Balance 327 1080 350


 


Weight 109.3 kg 107.7 kg 











Exam: 





GENERAL: well-nourished and in no acute distress.  Alert and oriented x3


HEAD: Atraumatic, normocephalic.


EYES: Pupils equal round and reactive to light, extraocular movements intact, 

sclera anicteric, conjunctiva are normal.


ENT: TMs normal, nares patent, oropharynx clear without exudates. Moist mucous 

membranes.  No oral ulcerations or bleeding gums noted


NECK: supple without lymphadenopathy.  Trachea is central.  No cervical or 

axillary lymphadenopathy noted.  Carotids are 2+, JVD 8-10 cm


LUNGS: Respiration seems nonlabored, no significant accessory muscle action 

noted.  Bibasilar fine crackles noted. No wheezes rales or rhonchi noted.  No 

significant dullness noted on percussion.


CHEST: Palpation of the chest wall shows no significant chest wall tenderness.  

No other significant abnormalities noted.


HEART: [place] NP, No PSH,  1/6 ROSA aortic area, 1/6 pan systolic murmur mitral 

area, no rubs, no gallops.


ABDOMEN: Soft, no significant tenderness appreciated, normoactive bowel sounds. 

No guarding, no rebound.  No rigidity noted . No masses appreciated.


EXTREMITIES: Pedal pulses are 1-2+, no calf tenderness noted. No clubbing or 

cyanosis.trace to 1+ pedal edema noted


NEUROLOGICAL: Focused neurological exam showed no significant neurologic 

deficit. Normal speech, no focal weakness appreciated. 


PSYCH: Normal mood, normal affect.  Judgment and insight within normal limits.


SKIN: No significant ecchymosis, rash, ulcerations or signs of pruritus noted.


MUSCULOSKELETAL EXAM: No significant joint swelling noted.





Results


Laboratory Results: 


 





 07/30/17 06:02 





 07/30/17 06:02 





 











  07/30/17 07/30/17





  06:02 06:02


 


WBC  5.1 


 


RBC  5.69 H 


 


Hgb  16.3 


 


Hct  49.3 


 


MCV  87 


 


MCH  28.7 


 


MCHC  33.1 


 


RDW  14.5 H 


 


Plt Count  164 


 


Seg Neutrophils %  53.6 


 


Lymphocytes %  29.4 


 


Monocytes %  11.0 


 


Eosinophils %  4.8 


 


Basophils %  1.2 


 


Absolute Neutrophils  2.7 


 


Absolute Lymphocytes  1.5 


 


Absolute Monocytes  0.6 


 


Absolute Eosinophils  0.2 


 


Absolute Basophils  0.1 


 


Sodium   142.1


 


Potassium   4.3


 


Chloride   107


 


Carbon Dioxide   23


 


Anion Gap   12


 


BUN   19


 


Creatinine   1.46 H


 


Est GFR ( Amer)   59 L


 


Est GFR (Non-Af Amer)   49 L


 


Glucose   102


 


Calcium   9.4


 


Triglycerides   124


 


Cholesterol   228.20 H


 


LDL Cholesterol Direct   159 H


 


VLDL Cholesterol   25.0


 


HDL Cholesterol   46








 











  07/28/17 07/28/17 07/28/17





  12:50 12:50 12:50


 


Creatine Kinase  192 H  188 H 


 


CK-MB (CK-2)    2.83


 


Troponin I    0.027














  07/28/17 07/28/17 07/29/17





  20:30 20:30 05:00


 


Creatine Kinase  147   159


 


CK-MB (CK-2)   2.75 


 


Troponin I   0.131 














  07/29/17 07/30/17 07/30/17





  05:00 06:02 06:02


 


Creatine Kinase   116 


 


CK-MB (CK-2)  2.48   1.75


 


Troponin I  0.098   0.071











EKG Comments: 


Sinus rhythm.  No sustained tachycardia or bradycardia arrhythmias noted.


Impressions: 


 





Chest X-Ray  07/28/17 00:00


IMPRESSION:  HEART ENLARGED WITHOUT FAILURE.  NO CHANGE.


 














Assessment & Plan





- Diagnosis


(1) NSTEMI (non-ST elevated myocardial infarction)


Is this a current diagnosis for this admission?: Yes





(2) Hypertensive emergency


Is this a current diagnosis for this admission?: Yes





(3) Chest pain


Qualifiers: 


     Chest pain type: unspecified     Qualified Code(s): R07.9 - Chest pain, 

unspecified  


Is this a current diagnosis for this admission?: Yes





(4) Coronary artery disease


Qualifiers: 


     Coronary Disease-Associated Artery/Lesion type: unspecified vessel or 

lesion type     Associated angina: angina presence unspecified


Is this a current diagnosis for this admission?: Yes





(5) Type 2 diabetes mellitus


Qualifiers: 


     Diabetes mellitus complication status: with unspecified complications     

Diabetes mellitus long term insulin use: without long term use        Qualified 

Code(s): E11.8 - Type 2 diabetes mellitus with unspecified complications; Z79.4 

- Long term (current) use of insulin  


Is this a current diagnosis for this admission?: Yes





(6) Cardiac defibrillator in situ


Is this a current diagnosis for this admission?: Yes





(7) Congestive heart failure


Qualifiers: 


     Congestive heart failure type: combined     Congestive heart failure 

chronicity: acute on chronic        Qualified Code(s): I50.43 - Acute on 

chronic combined systolic (congestive) and diastolic (congestive) heart failure

  


Is this a current diagnosis for this admission?: Yes








- Notes


Notes: 


Non-STEMI: Based on enzyme elevation and symptoms of chest discomfort patient 

seems to have sustained non-STEMI.  Could have been precipitated by severe 

hypertension.  Cannot rule out component of ischemia.  Patient to be scheduled 

for a nuclear stress test.


Hypertensive emergency: Patient was noted to have severe hypertension on 

presentation along with precipitation of chest pain.  Currently blood pressure 

under satisfactory control.


Coronary artery disease: Patient has significant CAD and is status post CABG 

and also multiple stent placement.  Patient's medical regimen will be 

optimized.  Patient will benefit from a nuclear stress test prior to discharge 

and this will be scheduled.  This is needed because of presentation with chest 

pain and subsequent positive cardiac enzymes elevation.  Nuclear stress test 

has been scheduled for tomorrow.


Congestive heart failure: This seems clinically compensated by exam.  This is 

related to systolic plus diastolic dysfunction.  Echocardiogram shows systolic 

dysfunction as well as diastolic dysfunction without any significant valvular 

abnormalities..  Will recommend entresto therapy, beta-blocker therapy and 

diuretics therapy.  Patient will also benefit from salt and fluid restriction.  

Entresto therapy not to be started at this time as there is some concern about 

affordability by the patient.


Status post defibrillator placement: Patient claims defibrillator was placed in 

[place].  Last ICD battery change out was 3 years ago.  Currently 

defibrillator seems to be functioning normally.


Chest pain: Currently chest pain-free.  Patient subsequently noted to have high 

troponin I.  Patient will benefit from a ischemia workup.  Chest pain probably 

precipitated by severe hypertension.


Diabetes: Recommend good control of blood sugar.  However should avoid any 

hypoglycemia.  Patient being expertly managed by primary care M.D.








- Time


Time with patient: Greater than 35 minutes - CODE STATUS was discussed, patient 

remains full code.  Surrogate decision-maker unchanged.  Multiple medical 

problems were addressed.  More than 50% of the time spent coordinating care, 

discussing management plans with involved caregivers.  Management plans 

discussed with involved personnels.  Medical decision making was of moderate to 

high complexity, patient's has multiple  comorbidities.  Nuclear stress test 

procedure was explained to the patient in detail.  Risks benefits were 

discussed and informed consent was obtained.  Alternatives were discussed.  

Patient informed that based on risk factors, physical exam, lab data findings 

and symptoms there is at least intermediate probability of progression of 

underlying CAD.  Nuclear stress test procedure was therefore scheduled.


Medications reviewed and adjusted accordingly: Yes

## 2017-07-31 NOTE — PDOC PROGRESS REPORT
Subjective


Progress Note for:: 07/31/17


Subjective:: 


Patient was seen by the bedside he feels much better, he had a Cardiolite 

Lexiscan stress test done today, there was no scintigraphic evidence of 

reversible ischemia, it showed multiple old scars suggesting previous MI.





Physical Exam


Vital Signs: 


 











Temp Pulse Resp BP Pulse Ox


 


 98.6 F   72   18   135/66 H  98 


 


 07/31/17 19:48  07/31/17 19:48  07/31/17 19:48  07/31/17 19:48  07/31/17 19:48








 Intake & Output











 07/30/17 07/31/17 08/01/17





 06:59 06:59 06:59


 


Intake Total 1080 1358 869


 


Output Total   600


 


Balance 1080 1358 269


 


Weight 107.7 kg 108.4 kg 











General appearance: PRESENT: no acute distress, well-developed, well-nourished


Head exam: PRESENT: atraumatic, normocephalic


Eye exam: PRESENT: conjunctiva pink, EOMI, PERRLA.  ABSENT: scleral icterus


Ear exam: PRESENT: normal external ear exam


Mouth exam: PRESENT: moist, tongue midline


Neck exam: PRESENT: full ROM.  ABSENT: carotid bruit, JVD, lymphadenopathy, 

thyromegaly


Respiratory exam: PRESENT: clear to auscultation nanda


Cardiovascular exam: PRESENT: RRR, +S1, +S2


Vascular exam: PRESENT: normal capillary refill


GI/Abdominal exam: PRESENT: normal bowel sounds, soft


Rectal exam: PRESENT: deferred


Neurological exam: PRESENT: alert, awake, oriented to person, oriented to place

, oriented to time, oriented to situation, CN II-XII grossly intact.  ABSENT: 

motor sensory deficit


Psychiatric exam: PRESENT: appropriate affect, normal mood.  ABSENT: homicidal 

ideation, suicidal ideation


Skin exam: PRESENT: dry, intact, warm.  ABSENT: cyanosis, rash





Results


Laboratory Results: 


 





 07/30/17 06:02 





 07/30/17 06:02 





 











  07/28/17 07/28/17 07/28/17





  12:50 12:50 12:50


 


Creatine Kinase  192 H  188 H 


 


CK-MB (CK-2)    2.83


 


Troponin I    0.027














  07/28/17 07/28/17 07/29/17





  20:30 20:30 05:00


 


Creatine Kinase  147   159


 


CK-MB (CK-2)   2.75 


 


Troponin I   0.131 














  07/29/17 07/30/17 07/30/17





  05:00 06:02 06:02


 


Creatine Kinase   116 


 


CK-MB (CK-2)  2.48   1.75


 


Troponin I  0.098   0.071











Impressions: 


 





Chest X-Ray  07/28/17 00:00


IMPRESSION:  HEART ENLARGED WITHOUT FAILURE.  NO CHANGE.


 














Assessment & Plan





- Diagnosis


(1) Hypertensive emergency


Is this a current diagnosis for this admission?: Yes





(2) Chest pain


Qualifiers: 


     Chest pain type: unspecified     Qualified Code(s): R07.9 - Chest pain, 

unspecified  


Is this a current diagnosis for this admission?: Yes





(3) Coronary artery disease


Qualifiers: 


     Coronary Disease-Associated Artery/Lesion type: unspecified vessel or 

lesion type     Associated angina: angina presence unspecified


Is this a current diagnosis for this admission?: Yes





(4) Type 2 diabetes mellitus


Qualifiers: 


     Diabetes mellitus complication status: with unspecified complications     

Diabetes mellitus long term insulin use: without long term use        Qualified 

Code(s): E11.8 - Type 2 diabetes mellitus with unspecified complications; Z79.4 

- Long term (current) use of insulin  


Is this a current diagnosis for this admission?: Yes

## 2017-07-31 NOTE — PDOC PROGRESS REPORT
Subjective


Progress Note for:: 07/31/17


Subjective:: 


Patient seems to be doing better with gradual improvement.  Pt is denying any 

chest arm or neck discomfort.  Patient denying any PND, orthopnea.  Patient 

denied any sustained palpitations, dizziness, syncope, near syncope.  Patient 

denying any fever chills.  Patient denying any other significant discomfort.  


In the morning nuclear stress test procedure was discussed.  Risk benefits 

discussed.  Patient gave informed consent.





Patient is maintaining sinus rhythm with ventricular paced beats.





Review of systems: Rest review of systems negative.





Medications: Medications have been reviewed.





Physical Exam


Vital Signs: 


 











Temp Pulse Resp BP Pulse Ox


 


 98.4 F   77   16   119/61   100 


 


 07/31/17 15:07  07/31/17 15:07  07/31/17 15:07  07/31/17 15:07  07/31/17 15:07








 Intake & Output











 07/30/17 07/31/17 08/01/17





 06:59 06:59 06:59


 


Intake Total 1080 1358 869


 


Output Total   600


 


Balance 1080 1358 269


 


Weight 107.7 kg 108.4 kg 











Exam: 





GENERAL: well-nourished and in no acute distress.  Alert and oriented x3


HEAD: Atraumatic, normocephalic.


EYES: Pupils equal round and reactive to light, extraocular movements intact, 

sclera anicteric, conjunctiva are normal.


ENT: TMs normal, nares patent, oropharynx clear without exudates. Moist mucous 

membranes.  No oral ulcerations or bleeding gums noted


NECK: supple without lymphadenopathy.  Trachea is central.  No cervical or 

axillary lymphadenopathy noted.  Carotids are 2+, JVD WNL 


LUNGS: Respiration seems nonlabored, no significant accessory muscle action 

noted.  Breath sounds clear to auscultation bilaterally and equal noted. No 

wheezes rales or rhonchi noted.  No significant dullness noted on percussion.


CHEST: Palpation of the chest wall shows no significant chest wall tenderness.  

No other significant abnormalities noted.  Defibrillator noted left-sided chest.


HEART: [place] NP, No PSH,  1/6 ROSA aortic area, 1/6 pan systolic murmur mitral 

area, no rubs, no gallops.


ABDOMEN: Soft, no significant tenderness appreciated, normoactive bowel sounds. 

No guarding, no rebound.  No rigidity noted . No masses appreciated.


EXTREMITIES: Pedal pulses are 1-2+, no calf tenderness noted. No clubbing or 

cyanosis.trace to 1+ pedal edema noted


NEUROLOGICAL: Focused neurological exam showed no significant neurologic 

deficit. Normal speech, no focal weakness appreciated. 


PSYCH: Normal mood, normal affect.  Judgment and insight within normal limits.


SKIN: No significant ecchymosis, rash, ulcerations or signs of pruritus noted.


MUSCULOSKELETAL EXAM: No significant joint swelling noted.





Results


Laboratory Results: 


 





 07/30/17 06:02 





 07/30/17 06:02 





 











  07/28/17 07/28/17 07/28/17





  12:50 12:50 12:50


 


Creatine Kinase  192 H  188 H 


 


CK-MB (CK-2)    2.83


 


Troponin I    0.027














  07/28/17 07/28/17 07/29/17





  20:30 20:30 05:00


 


Creatine Kinase  147   159


 


CK-MB (CK-2)   2.75 


 


Troponin I   0.131 














  07/29/17 07/30/17 07/30/17





  05:00 06:02 06:02


 


Creatine Kinase   116 


 


CK-MB (CK-2)  2.48   1.75


 


Troponin I  0.098   0.071











EKG Comments: 


Telemetry strips reviewed.  Showed sinus rhythm.  No sustained tachycardia or 

bradycardia arrhythmias noted.  Occasional PVCs noted.


Impressions: 


 





Chest X-Ray  07/28/17 00:00


IMPRESSION:  HEART ENLARGED WITHOUT FAILURE.  NO CHANGE.


 














Assessment & Plan





- Diagnosis


(1) NSTEMI (non-ST elevated myocardial infarction)


Is this a current diagnosis for this admission?: Yes





(2) Hypertensive emergency


Is this a current diagnosis for this admission?: Yes





(3) Chest pain


Qualifiers: 


     Chest pain type: unspecified     Qualified Code(s): R07.9 - Chest pain, 

unspecified  


Is this a current diagnosis for this admission?: Yes





(4) Coronary artery disease


Qualifiers: 


     Coronary Disease-Associated Artery/Lesion type: unspecified vessel or 

lesion type     Associated angina: angina presence unspecified


Is this a current diagnosis for this admission?: Yes





(5) Type 2 diabetes mellitus


Qualifiers: 


     Diabetes mellitus complication status: with unspecified complications     

Diabetes mellitus long term insulin use: without long term use        Qualified 

Code(s): E11.8 - Type 2 diabetes mellitus with unspecified complications; Z79.4 

- Long term (current) use of insulin  


Is this a current diagnosis for this admission?: Yes





(6) Cardiac defibrillator in situ


Is this a current diagnosis for this admission?: Yes





(7) Congestive heart failure


Qualifiers: 


     Congestive heart failure type: combined     Congestive heart failure 

chronicity: acute on chronic        Qualified Code(s): I50.43 - Acute on 

chronic combined systolic (congestive) and diastolic (congestive) heart failure

  


Is this a current diagnosis for this admission?: Yes








- Notes


Notes: 


Non-STEMI: Based on enzyme elevation and symptoms of chest discomfort patient 

seems to have sustained non-STEMI.  Could have been precipitated by severe 

hypertension.  Nuclear stress test shows mainly a severe fixed defect with no 

definitive transient perfusion defect.  Borderline transient ischemic 

dilatation was noted but could well be related to LVH.  Nonetheless, discussed 

that we could try medical management or send him for heart catheterization.  

Patient prefers to undergo medical management at this point.  Patient was told 

that should he have any further chest pain while his blood pressure under 

satisfactory control then he should definitely consider undergoing heart 

catheterization.  Right now mutual decision was made to treat patient with very 

aggressive risk factor modification and medical management.


Hypertensive emergency: Patient was noted to have severe hypertension on 

presentation along with precipitation of chest pain.  Currently blood pressure 

under satisfactory control.


Coronary artery disease: Patient has significant CAD and is status post CABG 

and also multiple stent placement.  Patient's medical regimen will be 

optimized.  Nuclear stress test results were discussed with patient and his 

wife.  Their questions were answered.


Congestive heart failure: This seems clinically compensated by exam.  This is 

related to systolic plus diastolic dysfunction.  Echocardiogram shows systolic 

dysfunction as well as diastolic dysfunction without any significant valvular 

abnormalities..  Will recommend entresto therapy, beta-blocker therapy and 

diuretics therapy.  Patient will also benefit from salt and fluid restriction.  

Entresto therapy not to be started at this time as there is some concern about 

affordability by the patient.


Status post defibrillator placement: Patient claims defibrillator was placed in 

[place].  Last ICD battery change out was 3 years ago.  Currently 

defibrillator seems to be functioning normally.


Chest pain: Currently chest pain-free.  Patient subsequently noted to have high 

troponin I.  Chest pain probably precipitated by severe hypertension.  Patient 

to report any recurrence of chest pain.  He was informed to inform the nurses.  

If he has any recurrent chest pain, will recommend transfer to tertiary care 

for heart Otherwise continue medical management.


Diabetes: Recommend good control of blood sugar.  However should avoid any 

hypoglycemia.  Patient being expertly managed by primary care M.D.








- Time


Time with patient: Greater than 35 minutes - CODE STATUS was discussed, patient 

remains full code.  Surrogate decision-maker unchanged.  Multiple medical 

problems were addressed.  More than 50% of the time spent coordinating care, 

discussing management plans with involved caregivers.  Management plans 

discussed with involved personnels.  Medical decision making was of  high 

complexity, patient's has multiple  comorbidities.


Medications reviewed and adjusted accordingly: Yes

## 2017-07-31 NOTE — DRAGON STRESS TEST REPORT
INTRAVENOUS LEXISCAN CARDIOLITE STRESS TEST USING SINGLE PHOTON EMMISION 
COMPUTERIZED TOMOGRAPHIC.



DATE OF PROCEDURE: July 31, 2017



INDICATION : Chest pain and non-STEMI



CARDIAC RISK FACTORS: Severe hypertension, known CAD, diabetes, hypertension, 
dyslipidemia



RESTING EKG: Sinus rhythm, abnormal Q waves inferior and lateral chest leads 
suggestive of prior myocardial infarction.



STRESS EKG: No significant changes noted with LexiScan bolus 



REASON FOR TERMINATION: Protocol.





PROCEDURE REPORT: Baseline heart rate 83 beats per minute with blood pressure 
of 107/80.  Patient had no significant complaints.  

Heart rate at 2 minutes post bolus 108 with a blood pressure of 136/81.  

3 minutes post bolus heart rate 90 with blood pressure of 135/75.  

No significant EKG changes were noted.  Patient had no significant complaints 
during the procedure or postprocedure. 

Patient injected with Aminophyllin 75 mg at 3 minutes or later after Lexiscan 
bolus.



CONCLUSIONS: Normal EKG and hemodynamic response to IV LexiScan.







NUCLEAR DATA:

At rest the patient was given 13.22 millicuries of technetium 99 sestamibi 
injected intravenously.  As per protocol rest gated SPECT images were obtained.
  Subsequently the patient was given intravenous LexiScan at a dose of 0.4 mg 
in 5 mL intravenously, followed by flush with normal saline.  Subsequently the 
stress dose of 42.8 millicuries of technetium 99 sestamibi was injected 
intravenously.  As per protocol stress gated images were obtained.  



NUCLEAR INTERPRETATION: Both raw and processed data were used for 
interpretation.  Visual, qualitative, computer-generated quantitative data was 
used.  There was good myocardial uptake of technetium compound.  Motion 
artifact and soft tissue attenuations were noted.  Increased visceral uptake 
was noted.  No definitive areas of transient perfusion defect noted.  Severe 
fixed defect, indicative of severe scar noted involving the inferior, 
inferolateral and lateral wall of the left ventricle.



EKG gated imaging showed LV EF at 18 %, rest and stress gated EF similar 
visually with diffuse hypokinesia and severe diffuse hypokinesia to akinesia of 
the inferior and inferolateral wall..  T. I D. ratio was 1.28.  Lung heart 
ratio noted to be within normal limits 0.34.  No significant extracardiac and 
abnormal radiotracer activities were noted.  RV free wall uptake was noted to 
be wnl.



IMPRESSION: Also refer to comments under nuclear interpretation. Also test 
results needs to be interpreted in the context of pretest probability.





1.  There is no definitive scintigraphic evidence of LexiScan induced 
myocardial ischemia.

2.  Severe fixed defect, indicative of severe scar noted involving the inferior
, inferolateral and lateral wall of the left ventricle

3.  EKG gated imaging shows left ejection fraction of approximately 18% with 
diffuse hypokinesia and severe diffuse hypokinesia to akinesia of the inferior 
and inferolateral wall.

4.  Mild transient ischemic dilatation noted however could be related to LVH.  
Cannot rule out global ischemia but felt unlikely based on perfusion data.



RECOMMENDATIONS:

Aggressive risk factor modification, medical therapy.  Low threshold for heart 
catheterization.

Clinical correlation with echocardiogram derived ejection fraction.

Inability to exercise by itself can lead to increased cardiovascular event 
risks.

Consider cardiology consultation and or follow-up if clinically indicated.

I AM AVAILABLE FOR CARDIOLOGY CONSULTATION AND FOLLOWUP IF REQUESTED BY PMD











Patrice Mccain M.D., [facility]

Consultant cardiologist,

Board certified in cardiovascular diseases, 

Nuclear cardiology, 

Echocardiography

Cardiac CT and cardiac MRI

Ph. [phone] 

Fax [phone]
JOHN

## 2017-08-01 NOTE — PDOC DISCHARGE SUMMARY
General





- Admit/Disc Date/PCP


Admission Date/Primary Care Provider: 


  07/29/17 13:44





  PREETI THOMSON MD





Discharge Date: 08/01/17





- Discharge Diagnosis


(1) Hypertensive emergency


Is this a current diagnosis for this admission?: Yes





(2) Chest pain


Is this a current diagnosis for this admission?: Yes





(3) Coronary artery disease


Is this a current diagnosis for this admission?: Yes





(4) Type 2 diabetes mellitus


Is this a current diagnosis for this admission?: Yes





(5) Ischemic cardiomyopathy


Is this a current diagnosis for this admission?: Yes





(6) AICD (automatic cardioverter/defibrillator) present


Is this a current diagnosis for this admission?: Yes








- Additional Information


Resuscitation Status: Full Code


Discharge Diet: Cardiac


Discharge Activity: Activity As Tolerated, Balance Activity w/Rest


Home Medications: 








Albuterol Sulfate [Albuterol Sulfate 2.5mg/3 mL] 1 vial IH Q4 PRN #12 ml 08/01/ 17 


Aspirin [Aspirin EC] 81 mg PO DAILY #90 tablet.dr 08/01/17 


Atorvastatin Calcium [Lipitor 80 mg Tablet] 80 mg PO QHS #90 tablet 08/01/17 


Carvedilol [Coreg 25 mg Tablet] 25 mg PO Q12 #60 tablet 08/01/17 


Isosorb Dinit/Hydralazine HCl [Bidil 20-37.5 mg Tablet] 1 tab PO Q8 #90 tablet 

08/01/17 


Lisinopril [Prinivil 40 mg Tablet] 40 mg PO DAILY #30 tablet 08/01/17 


Nitroglycerin [Nitrostat] 0.4 mg SL PRN PRN #60 tab.subl 08/01/17 


Ranolazine [Ranexa] 1,000 mg PO BID #60 tab.er.12h 08/01/17 


Sitagliptin Phos/Metformin HCl [Janumet Xr 100-1,000 mg Tablet] 1 each PO DAILY 

#30 tbmp.24hr 08/01/17 


Ticagrelor [Brilinta 90 mg Tablet] 90 mg PO BID #60 tablet 08/01/17 











History of Present Illness


History of Present Illness: 


Patient 64-year-old male with a known history of ischemic cardiomyopathy 

chronic chronic systolic heart failure, coronary artery disease status post 

multiple stents insertion, CABG AICD pacemaker placement he came to the office 

today because of chest pain and  shortness of breath.He has no medical 

insurance and is not very compliant with care, in the office a 12-lead EKG was 

done it showed sinus rhythm with old inferior wall MI, right bundle branch block

, there is no change from 2015 EKG he was admitted directly to the hospital for 

management





Hospital Course


Hospital Course: 


Patient was admitted for the evaluation of hypertensive emergency, he was very 

symptomatic with blood pressure above 200 systolic.  He was treated with 

intravenous nitroglycerin infusion, he was seen by cardiology Dr. Mccain, he 

underwent Cardiolite stress test, 2D echo.  The Cardiolite stress test did not 

show any reversible ischemia, it showed old scars from previous myocardial 

infarction.  2D echo showed a very low ejection fraction of left ventricle but 

patient was clinically compensated and was not in clinical heart failure on 

this admission.  Patient improved with management, he has a history of coronary 

artery disease with multiple stent placements he has no more room for cardiac 

intervention at this point is management is medical.





Physical Exam


Vital Signs: 


 











Temp Pulse Resp BP Pulse Ox


 


 98.4 F   74   17   119/71   98 


 


 08/01/17 18:06  08/01/17 18:06  08/01/17 18:06  08/01/17 18:06  08/01/17 18:06








 Intake & Output











 07/31/17 08/01/17 08/02/17





 06:59 06:59 06:59


 


Intake Total 1358 874 720


 


Output Total  600 


 


Balance 1358 274 720


 


Weight 108.4 kg 108.6 kg 











General appearance: PRESENT: no acute distress, well-developed, well-nourished


Head exam: PRESENT: atraumatic, normocephalic


Eye exam: PRESENT: conjunctiva pink, EOMI, PERRLA.  ABSENT: scleral icterus


Ear exam: PRESENT: normal external ear exam


Mouth exam: PRESENT: moist, tongue midline


Neck exam: PRESENT: full ROM


Cardiovascular exam: PRESENT: RRR, +S1, +S2


Pulses: PRESENT: normal dorsalis pedis pul, +2 pedal pulses bilateral


Vascular exam: PRESENT: normal capillary refill


GI/Abdominal exam: PRESENT: normal bowel sounds, soft


Rectal exam: PRESENT: deferred


Neurological exam: PRESENT: alert, awake, oriented to person, oriented to place

, oriented to time, oriented to situation, CN II-XII grossly intact


Psychiatric exam: PRESENT: appropriate affect, normal mood


Skin exam: PRESENT: dry, intact, warm





Results


Laboratory Results: 


 





 07/30/17 06:02 





 07/30/17 06:02 





 











  07/28/17 07/28/17 07/28/17





  12:50 12:50 12:50


 


Creatine Kinase  192 H  188 H 


 


CK-MB (CK-2)    2.83


 


Troponin I    0.027














  07/28/17 07/28/17 07/29/17





  20:30 20:30 05:00


 


Creatine Kinase  147   159


 


CK-MB (CK-2)   2.75 


 


Troponin I   0.131 














  07/29/17 07/30/17 07/30/17





  05:00 06:02 06:02


 


Creatine Kinase   116 


 


CK-MB (CK-2)  2.48   1.75


 


Troponin I  0.098   0.071











Impressions: 


 





Chest X-Ray  07/28/17 00:00


IMPRESSION:  HEART ENLARGED WITHOUT FAILURE.  NO CHANGE.

## 2017-08-01 NOTE — PDOC PROGRESS REPORT
Subjective


Progress Note for:: 08/01/17


Subjective:: 


Patient seems to be doing better with gradual improvement.  Pt is denying any 

chest arm or neck discomfort.  Patient denying any PND, orthopnea.  Patient 

denied any sustained palpitations, dizziness, syncope, near syncope.  Patient 

denying any fever chills.  Patient denying any other significant discomfort.  


Nuclear stress test results were again reviewed with the patient.  Patient 

denied any recurrence of chest pain.  He was encouraged to ambulate in the 

hallway..





Patient is maintaining sinus rhythm with ventricular paced beats.





Review of systems: Rest review of systems negative.





Medications: Medications have been reviewed.





Physical Exam


Vital Signs: 


 











Temp Pulse Resp BP Pulse Ox


 


 98.4 F   74   17   119/71   98 


 


 08/01/17 18:06  08/01/17 18:06  08/01/17 18:06  08/01/17 18:06  08/01/17 18:06








 Intake & Output











 07/31/17 08/01/17 08/02/17





 06:59 06:59 06:59


 


Intake Total 1358 874 720


 


Output Total  600 


 


Balance 1358 274 720


 


Weight 108.4 kg 108.6 kg 











Exam: 





GENERAL: well-nourished and in no acute distress.  Alert and oriented x3


HEAD: Atraumatic, normocephalic.


EYES: Pupils equal round and reactive to light, extraocular movements intact, 

sclera anicteric, conjunctiva are normal.


ENT: TMs normal, nares patent, oropharynx clear without exudates. Moist mucous 

membranes.  No oral ulcerations or bleeding gums noted


NECK: supple without lymphadenopathy.  Trachea is central.  No cervical or 

axillary lymphadenopathy noted.  Carotids are 2+, JVD WNL 


LUNGS: Respiration seems nonlabored, no significant accessory muscle action 

noted.  Breath sounds clear to auscultation bilaterally and equal noted. No 

wheezes rales or rhonchi noted.  No significant dullness noted on percussion.


CHEST: Palpation of the chest wall shows no significant chest wall tenderness.  

No other significant abnormalities noted.  Defibrillator noted on the left side 

chest.


HEART: [place] NP, No PSH,  1/6 ROSA aortic area, 1/6 pan systolic murmur mitral 

area, no rubs, no gallops.


ABDOMEN: Soft, no significant tenderness appreciated, normoactive bowel sounds. 

No guarding, no rebound.  No rigidity noted . No masses appreciated.


EXTREMITIES: Pedal pulses are 1-2+, no calf tenderness noted. No clubbing or 

cyanosis.trace to 1+ pedal edema noted


NEUROLOGICAL: Focused neurological exam showed no significant neurologic 

deficit. Normal speech, no focal weakness appreciated. 


PSYCH: Normal mood, normal affect.  Judgment and insight within normal limits.


SKIN: No significant ecchymosis, rash, ulcerations or signs of pruritus noted.


MUSCULOSKELETAL EXAM: No significant joint swelling noted.





Results


Laboratory Results: 


 





 07/30/17 06:02 





 07/30/17 06:02 





 











  07/28/17 07/28/17 07/28/17





  12:50 12:50 12:50


 


Creatine Kinase  192 H  188 H 


 


CK-MB (CK-2)    2.83


 


Troponin I    0.027














  07/28/17 07/28/17 07/29/17





  20:30 20:30 05:00


 


Creatine Kinase  147   159


 


CK-MB (CK-2)   2.75 


 


Troponin I   0.131 














  07/29/17 07/30/17 07/30/17





  05:00 06:02 06:02


 


Creatine Kinase   116 


 


CK-MB (CK-2)  2.48   1.75


 


Troponin I  0.098   0.071











EKG Comments: 


Telemetry strips shows sinus rhythm with occasional APCs and VPCs.


Impressions: 


 





Chest X-Ray  07/28/17 00:00


IMPRESSION:  HEART ENLARGED WITHOUT FAILURE.  NO CHANGE.


 














Assessment & Plan





- Diagnosis


(1) NSTEMI (non-ST elevated myocardial infarction)


Is this a current diagnosis for this admission?: Yes





(2) Hypertensive emergency


Is this a current diagnosis for this admission?: Yes





(3) Chest pain


Qualifiers: 


     Chest pain type: unspecified     Qualified Code(s): R07.9 - Chest pain, 

unspecified  


Is this a current diagnosis for this admission?: Yes





(4) Coronary artery disease


Qualifiers: 


     Coronary Disease-Associated Artery/Lesion type: unspecified vessel or 

lesion type     Associated angina: angina presence unspecified


Is this a current diagnosis for this admission?: Yes





(5) Type 2 diabetes mellitus


Qualifiers: 


     Diabetes mellitus complication status: with unspecified complications     

Diabetes mellitus long term insulin use: without long term use        Qualified 

Code(s): E11.8 - Type 2 diabetes mellitus with unspecified complications; Z79.4 

- Long term (current) use of insulin  


Is this a current diagnosis for this admission?: Yes





(6) Cardiac defibrillator in situ


Is this a current diagnosis for this admission?: Yes





(7) Congestive heart failure


Qualifiers: 


     Congestive heart failure type: combined     Congestive heart failure 

chronicity: acute on chronic        Qualified Code(s): I50.43 - Acute on 

chronic combined systolic (congestive) and diastolic (congestive) heart failure

  


Is this a current diagnosis for this admission?: Yes








- Notes


Notes: 


Non-STEMI: Currently is stable.  Recent cardiac evaluations including nuclear 

stress test and also echocardiogram results were again discussed with the 

patient and his wife.  They still prefer medical management.  Patient was told 

that should he have any further chest pain while his blood pressure under 

satisfactory control then he should definitely consider undergoing heart 

catheterization.  


Hypertensive emergency: Patient was noted to have severe hypertension on 

presentation along with precipitation of chest pain.  Currently blood pressure 

under satisfactory control.  Compliance with medication as well as diet was 

emphasized to the patient.


Coronary artery disease: Patient has significant CAD and is status post CABG 

and also multiple stent placement.  Patient's medical regimen has been 

optimized.  Nuclear stress test results were discussed with patient and his 

wife.  Their questions were answered.


Congestive heart failure: This seems clinically compensated by exam.  This is 

related to systolic plus diastolic dysfunction.  Echocardiogram shows systolic 

dysfunction as well as diastolic dysfunction without any significant valvular 

abnormalities..  Will recommend entresto therapy, beta-blocker therapy and 

diuretics therapy.  Patient will also benefit from salt and fluid restriction.  

Entresto therapy not to be started at this time as there is some concern about 

affordability by the patient.  Entresto therapy should be considered as an 

outpatient once patient can afford it.


Status post defibrillator placement: Patient claims defibrillator was placed in 

[place].  Last ICD battery change out was 3 years ago.  Currently 

defibrillator seems to be functioning normally.


Chest pain: Currently chest pain-free.  Patient subsequently noted to have high 

troponin I.  Chest pain probably precipitated by severe hypertension.  Patient 

to report any recurrence of chest pain.  He was informed to inform the nurses.  

If he has any recurrent chest pain, will recommend transfer to tertiary care 

for heart Otherwise continue medical management.


Diabetes: Recommend good control of blood sugar.  However should avoid any 

hypoglycemia.  Patient being expertly managed by primary care M.DAndre








- Time


Time with patient: 15-25 minutes - CODE STATUS was discussed, patient remains 

full code.  Surrogate decision-maker unchanged.  Multiple medical problems were 

addressed.  More than 50% of the time spent coordinating care, discussing 

management plans with involved caregivers.  Management plans discussed with 

involved personnels.  Medical decision making was of moderate to high complexity

, patient's has multiple  comorbidities.


Medications reviewed and adjusted accordingly: Yes

## 2018-02-20 NOTE — ER DOCUMENT REPORT
ED Respiratory Problem





- General


Chief Complaint: Shortness Of Breath


Stated Complaint: SHORTNESS OF BREATH


Time Seen by Provider: 02/20/18 14:58


Notes: 


The patient is a 65-year-old male, past medical history CHF, HTN, CAD, presents 

after an episode of mild chest pressure this morning that began at rest.  He 

was also having some shortness of breath.  Patient took 2 nitro and his chest 

pain went away, but he is still feeling short of breath.  EMS placed him on 1 L 

nasal cannula and he feels much better.


TRAVEL OUTSIDE OF THE [place] IN LAST 30 DAYS: No





- Related Data


Allergies/Adverse Reactions: 


 





Iodinated Contrast- Oral and IV Dye [IV Dye, Iodine Containing] Allergy (

Verified 08/15/15 06:29)


 











Past Medical History





- General


Information source: Patient





- Social History


Smoking Status: Unknown if Ever Smoked


Family History: Reviewed & Not Pertinent.  denies: CAD





- Past Medical History


Cardiac Medical History: Reports: Hx Congestive Heart Failure, Hx Coronary 

Artery Disease, Hx Heart Attack - six, Hx Hypercholesterolemia, Hx Hypertension


Pulmonary Medical History: Reports: Hx Asthma, Hx Bronchitis, Hx COPD, Hx 

Pneumonia


Neurological Medical History: Reports: Hx Cerebrovascular Accident - 2011.  

Denies: Hx Seizures


Endocrine Medical History: Reports: Hx Diabetes Mellitus Type 2


Renal/ Medical History: Reports: Hx Benign Prostatic Hyperplasia


GI Medical History: Reports: Hx Gastroesophageal Reflux Disease


Musculoskeltal Medical History: Denies Hx Arthritis


Psychiatric Medical History: 


   Denies: Hx Depression


Past Surgical History: Reports: Hx Abdominal Surgery - hernia, Hx Cardiac 

Catheterization, Hx Cardiac Surgery - 8 stents, bypass sx, defibrilator, Hx 

Coronary Artery Bypass Graft - x 3, Hx Coronary Stent - x 8, Hx Pacemaker, Hx 

Tonsillectomy





- Immunizations


Immunizations up to date: Yes


Hx Diphtheria, Pertussis, Tetanus Vaccination: Yes


Hx Pneumococcal Vaccination: 08/18/14





Review of Systems





- Review of Systems


Notes: 


REVIEW OF SYSTEMS:


CONSTITUTIONAL: -fevers, -chills


EENT: -eye pain, -difficulty swallowing, -nasal congestion


CARDIOVASCULAR: +chest pain, -syncope.


RESPIRATORY: -cough, +SOB


GASTROINTESTINAL: -abdominal pain, -nausea, -vomiting, -diarrhea


GENITOURINARY: -dysuria, -hematuria


MUSCULOSKELETAL: -back pain, -neck pain


SKIN: -rash or skin lesions.


HEMATOLOGIC: -easy bruising or bleeding.


LYMPHATIC: -swollen, enlarged glands.


NEUROLOGICAL: -altered mental status or loss of consciousness, -headache, -

neurologic symptoms


PSYCHIATRIC: -anxiety, -depression.


ALL OTHER SYSTEMS REVIEWED AND NEGATIVE.





Physical Exam





- Vital signs


Vitals: 


 











Pulse Ox


 


 99 


 


 02/20/18 15:14














- Notes


Notes: 


PHYSICAL EXAMINATION:


GENERAL: Well-appearing, well-nourished and in no acute distress.


HEAD: Atraumatic, normocephalic.


EYES: Pupils equal round and reactive to light, extraocular movements intact, 

sclera anicteric, conjunctiva are normal.


ENT: nares patent, oropharynx clear without exudates.  Moist mucous membranes.


NECK: Normal range of motion, supple without lymphadenopathy


LUNGS: Breath sounds clear to auscultation bilaterally and equal.  No wheezes 

rales or rhonchi.


HEART: Regular rate and rhythm without murmurs


ABDOMEN: Soft, nontender, normoactive bowel sounds.  No guarding, no rebound.  

No masses appreciated.


EXTREMITIES: Normal range of motion, no pitting or edema.  No cyanosis.


NEUROLOGICAL: Cranial nerves grossly intact.  Normal speech, normal gait.  

Normal sensory and motor exams.


PSYCH: Normal mood, normal affect.


SKIN: Warm, Dry, normal turgor, no rashes or lesions noted.





Course





- Re-evaluation


Re-evalutation: 


Patient with chest pain and shortness of breath that resolved after a nitro and 

aspirin.  Patient's blood pressure remained in the 220s/140s.  With the 

elevated troponin, but no EKG changes, suspect hypertensive emergency.  Patient 

was placed on a nitro drip with improvement of his blood pressure, SOB and 

chest pain.  Repeat troponin did not increase.  Patient's last stress test was 

9 months ago.  With his multiple comorbidities, patient requires admission for 

further evaluation and treatment of his chest pain, shortness of breath and 

hypertension.  He remains on a nitro drip.  His primary care physician is Dr. Ngo.





02/20/18 18:31 Placed call to Dr. Ngo and awaiting callback.





02/20/18 19:40 Spoke to Dr. Ngo and will admit patient as inpatient to 

[facility] for further evaluation his chest pain and hypertensive emergency.





- Vital Signs


Vital signs: 


 











Temp Pulse Resp BP Pulse Ox


 


       17   149/95 H  98 


 


       02/20/18 19:20  02/20/18 19:20  02/20/18 19:20














- Laboratory


Result Diagrams: 


 02/20/18 14:40





 02/20/18 14:40


Laboratory results interpreted by me: 


 











  02/20/18 02/20/18 02/20/18





  14:40 14:40 14:40


 


RBC  6.22 H  


 


Hgb  17.9 H  


 


Hct  53.2 H  


 


RDW  14.9 H  


 


Eosinophils %  7.0 H  


 


Creatinine   1.30 H 


 


Est GFR (Non-Af Amer)   55 L 


 


Direct Bilirubin   0.6 H 


 


NT-Pro-B Natriuret Pep    1830 H


 


Total Protein   8.4 H 














- Diagnostic Test


Radiology reviewed: Image reviewed, Reports reviewed


Radiology results interpreted by me: 


CXR: stable cardiomegaly, no active pulmonary disease





- EKG Interpretation by Me


EKG shows normal: Sinus rhythm


Rate: Normal


Axis/QRS: IVCD


When compared to previous EKG there are: No significant change





Critical Care Note





- Critical Care Note


Total time excluding time spent on procedures (mins): 45





Discharge





- Discharge


Clinical Impression: 


 Hypertensive emergency





Chest pain


Qualifiers:


 Chest pain type: unspecified Qualified Code(s): R07.9 - Chest pain, unspecified





Condition: Stable


Disposition: ADMITTED AS INPATIENT


Admitting Provider: [facility]


Unit Admitted: [facility]

## 2018-02-20 NOTE — EKG REPORT
SEVERITY:- ABNORMAL ECG -

SINUS RHYTHM

LEFT ATRIAL ABNORMALITY

NONSPECIFIC INTRAVENTRICULAR CONDUCTION DELAY

PROBABLE INFERIOR INFARCT, AGE INDETERMINATE

BORDERLINE R WAVE PROGRESSION, ANTERIOR LEADS , CONSIDER OLD ANTERIOR MI

LEFT POST FASCICULAR BLOCK

:

Confirmed by: Bryan Michele MD 20-Feb-2018 18:46:53

## 2018-02-21 NOTE — PDOC PROGRESS REPORT
Subjective


Progress Note for:: 02/21/18


Subjective:: 





Patient was seen by the bedside, he was seen by the cardiologist Dr. Mccain, no 

longer requiring IV nitroglycerin, patient is much better


Reason For Visit: 


HYPERTENSIVE EMERGENCY, NSTMI CAD, MULTIPLE








Physical Exam


Vital Signs: 


 











Temp Pulse Resp BP Pulse Ox


 


 98.0 F   79   18   123/66   100 


 


 02/21/18 12:00  02/21/18 14:00  02/21/18 12:00  02/21/18 12:00  02/21/18 12:00








 Intake & Output











 02/20/18 02/21/18 02/22/18





 06:59 06:59 06:59


 


Intake Total  45 222


 


Output Total  0 


 


Balance  45 222


 


Weight  109.1 kg 











General appearance: PRESENT: no acute distress, well-developed, well-nourished


Head exam: PRESENT: atraumatic, normocephalic


Eye exam: PRESENT: conjunctiva pink, EOMI, PERRLA


Ear exam: PRESENT: normal external ear exam


Mouth exam: PRESENT: moist, tongue midline


Neck exam: PRESENT: full ROM


Respiratory exam: PRESENT: clear to auscultation nanda


Cardiovascular exam: PRESENT: RRR, +S1, +S2


Vascular exam: PRESENT: normal capillary refill


GI/Abdominal exam: PRESENT: normal bowel sounds, soft


Rectal exam: PRESENT: deferred


Neurological exam: PRESENT: alert


Psychiatric exam: PRESENT: appropriate affect, normal mood


Skin exam: PRESENT: dry, intact, warm.  ABSENT: cyanosis, rash





Results


Laboratory Results: 


 





 02/21/18 03:35 





 02/21/18 03:35 





 











  02/20/18 02/21/18 02/21/18





  21:30 03:35 03:35


 


WBC   6.2 


 


RBC   5.66 H 


 


Hgb   16.2 


 


Hct   48.4 


 


MCV   85 


 


MCH   28.5 


 


MCHC   33.4 


 


RDW   14.8 H 


 


Plt Count   167 


 


Seg Neutrophils %   56.8 


 


Lymphocytes %   26.8 


 


Monocytes %   11.6 


 


Eosinophils %   4.5 


 


Basophils %   0.3 


 


Absolute Neutrophils   3.5 


 


Absolute Lymphocytes   1.7 


 


Absolute Monocytes   0.7 


 


Absolute Eosinophils   0.3 


 


Absolute Basophils   0.0 


 


Sodium    142.0


 


Potassium    4.0


 


Chloride    106


 


Carbon Dioxide    20 L


 


Anion Gap    16


 


BUN    16


 


Creatinine    1.12


 


Est GFR ( Amer)    > 60


 


Est GFR (Non-Af Amer)    > 60


 


Glucose    78


 


Calcium    8.8


 


Total Bilirubin    1.1


 


AST    14 L


 


ALT    34


 


Alkaline Phosphatase    54


 


Ammonia  16.2  


 


Total Protein    6.4


 


Albumin    4.1


 


Triglycerides    141


 


Cholesterol    245.50 H


 


LDL Cholesterol Direct    169 H


 


VLDL Cholesterol    28.0


 


HDL Cholesterol    43








 











  02/20/18 02/20/18 02/21/18





  21:30 21:30 03:35


 


Creatine Kinase  108  


 


CK-MB (CK-2)   1.44  1.64


 


Troponin I   0.136  0.168


 


NT-Pro-B Natriuret Pep    2600 H














  02/21/18 02/21/18 02/21/18





  03:35 11:11 11:11


 


Creatine Kinase  108  111 


 


CK-MB (CK-2)    1.83


 


Troponin I    0.124


 


NT-Pro-B Natriuret Pep   











Impressions: 


 





Chest X-Ray  02/20/18 14:59


IMPRESSION:  Stable cardiomegaly.  No active pulmonary disease.


 














Assessment & Plan





- Diagnosis


(1) Non-ST elevated myocardial infarction (non-STEMI)


Is this a current diagnosis for this admission?: Yes   





(2) Hypertensive emergency


Is this a current diagnosis for this admission?: Yes   





(3) AICD (automatic cardioverter/defibrillator) present


Is this a current diagnosis for this admission?: Yes   





(4) Coronary artery disease


Qualifiers: 


   Coronary Disease-Associated Artery/Lesion type: unspecified vessel or lesion 

type   Associated angina: angina presence unspecified 


Is this a current diagnosis for this admission?: Yes   





(5) Type 2 diabetes mellitus


Qualifiers: 


   Diabetes mellitus complication status: with neurologic complications   

Diabetes mellitus complication detail: with polyneuropathy   Diabetes mellitus 

long term insulin use: without long term use   Qualified Code(s): E11.42 - Type 

2 diabetes mellitus with diabetic polyneuropathy   


Is this a current diagnosis for this admission?: Yes   





- Plan Summary


Plan Summary: 





Patient will continue present treatment

## 2018-02-21 NOTE — PDOC H&P
History of Present Illness


Admission Date/PCP: 


  02/20/18 19:51





  PREETI THOMSON MD





History of Present Illness: 





Patient 65-year-old male with a history of coronary artery disease with 

multiple coronary intervention including CABG and multiple stent placements, 

type 2 diabetes mellitus, ischemic cardiomyopathy.  He came to the office with 

his daughter for evaluation of shortness of breath and chest pain, in the 

office a 12-lead E EKG was done, it showed old infarct but there was no acute 

ST segment elevation to suggest acute MI but the blood pressure recorded was 

over 200, he was given aspirin and rescue squad was called to the office.  He 

was transferred from the office to the emergency room by EMS for evaluation, in 

the emergency room he was evaluated he was found to have elevated troponin, the 

blood pressure was in the emergency hypertensive range, he was started on 

intravenous nitroglycerin infusion and admission was advised.  Patient is well-

known to me from previous encounter and also from the office.





Past Medical History


Cardiac Medical History: Reports: Congestive Heart Failure, Coronary Artery 

Disease, Myocardial Infarction - six, Hyperlipidema, Hypertension


Pulmonary Medical History: Reports: Asthma, Bronchitis, Chronic Obstructive 

Pulmonary Disease (COPD), Pneumonia


Neurological Medical History: 


   Denies: Seizures


Endocrine Medical History: Reports: Diabetes Mellitus Type 2


GI Medical History: Reports: Gastroesophageal Reflux Disease





Past Surgical History


Past Surgical History: Reports: Cardiac Catheterization, Coronary Artery Bypass 

Graft - x 3, Coronary Stent - x 8, Pacemaker, Tonsillectomy





Social History


Smoking Status: Former Smoker


Frequency of Alcohol Use: None


Hx Recreational Drug Use: No


Drugs: None


Hx Prescription Drug Abuse: No





- Advance Directive


Resuscitation Status: Full Code





Family History


Family History: Hypertension.  denies: CAD


Parental Family History Reviewed: Yes


Children Family History Reviewed: Yes


Sibling(s) Family History Reviewed.: Yes





Medication/Allergy


Home Medications: 








Atorvastatin Calcium [Lipitor 40 mg Tablet] 40 mg PO QHS 02/20/18 


Carvedilol [Coreg 25 mg Tablet] 1 tab PO BID 02/20/18 


Hydralazine HCl [Apresoline 25 mg Tablet] 25 mg PO TID 02/20/18 


Isosorb Dinit/Hydralazine HCl [Bidil 20-37.5 mg Tablet] 1 tab PO TID 02/20/18 


Isosorbide Mononitrate [Isosorbide Mononitrate ER] 30 mg PO DAILY 02/20/18 


Lisinopril [Zestril] 40 mg PO DAILY 02/20/18 


Ranolazine [Ranexa] 1,000 mg PO BID 02/20/18 


Sitagliptin Phos/Metformin HCl [Janumet 50-1,000 Mg Tablet] 1 each PO DAILY 02/ 20/18 


Ticagrelor [Brilinta 90 mg Tablet] 90 mg PO DAILY 02/20/18 








Allergies/Adverse Reactions: 


 





Iodinated Contrast- Oral and IV Dye [IV Dye, Iodine Containing] Allergy (

Verified 08/15/15 06:29)


 











Review of Systems


Constitutional: ABSENT: chills, fever(s), headache(s), weight gain, weight loss


Eyes: ABSENT: visual disturbances


Ears: ABSENT: hearing changes


Cardiovascular: PRESENT: chest pain, dyspnea on exertion


Respiratory: ABSENT: cough, hemoptysis


Gastrointestinal: ABSENT: abdominal pain, constipation, diarrhea, hematemesis, 

hematochezia, nausea, vomiting


Genitourinary: ABSENT: dysuria, hematuria


Musculoskeletal: ABSENT: joint swelling


Integumentary: ABSENT: rash, wounds


Neurological: ABSENT: abnormal gait, abnormal speech, confusion, dizziness, 

focal weakness, syncope


Psychiatric: ABSENT: anxiety, depression, homidical ideation, suicidal ideation


Endocrine: ABSENT: cold intolerance, heat intolerance, menstrual abnormalities, 

polydipsia, polyuria


Hematologic/Lymphatic: ABSENT: easy bleeding, easy bruising, lymphadenopathy





Physical Exam


Vital Signs: 


 











Temp Pulse Resp BP Pulse Ox


 


 98.0 F   79   18   123/66   100 


 


 02/21/18 12:00  02/21/18 14:00  02/21/18 12:00  02/21/18 12:00  02/21/18 12:00








 Intake & Output











 02/20/18 02/21/18 02/22/18





 06:59 06:59 06:59


 


Intake Total  45 222


 


Output Total  0 


 


Balance  45 222


 


Weight  109.1 kg 











General appearance: PRESENT: mild distress


Head exam: PRESENT: atraumatic, normocephalic


Eye exam: PRESENT: conjunctiva pink, EOMI, PERRLA


Ear exam: PRESENT: normal external ear exam


Mouth exam: PRESENT: moist, tongue midline


Neck exam: PRESENT: full ROM


Respiratory exam: PRESENT: rhonchi


Cardiovascular exam: PRESENT: RRR, +S1, +S2


Pulses: PRESENT: normal dorsalis pedis pul, +2 pedal pulses bilateral


Vascular exam: PRESENT: normal capillary refill


GI/Abdominal exam: PRESENT: normal bowel sounds, soft


Rectal exam: PRESENT: deferred


Neurological exam: PRESENT: alert


Psychiatric exam: PRESENT: appropriate affect, normal mood


Skin exam: PRESENT: dry, intact, warm





Results


Laboratory Results: 


 





 02/21/18 03:35 





 02/21/18 03:35 





 











  02/20/18 02/21/18 02/21/18





  21:30 03:35 03:35


 


WBC   6.2 


 


RBC   5.66 H 


 


Hgb   16.2 


 


Hct   48.4 


 


MCV   85 


 


MCH   28.5 


 


MCHC   33.4 


 


RDW   14.8 H 


 


Plt Count   167 


 


Seg Neutrophils %   56.8 


 


Lymphocytes %   26.8 


 


Monocytes %   11.6 


 


Eosinophils %   4.5 


 


Basophils %   0.3 


 


Absolute Neutrophils   3.5 


 


Absolute Lymphocytes   1.7 


 


Absolute Monocytes   0.7 


 


Absolute Eosinophils   0.3 


 


Absolute Basophils   0.0 


 


Sodium    142.0


 


Potassium    4.0


 


Chloride    106


 


Carbon Dioxide    20 L


 


Anion Gap    16


 


BUN    16


 


Creatinine    1.12


 


Est GFR ( Amer)    > 60


 


Est GFR (Non-Af Amer)    > 60


 


Glucose    78


 


Calcium    8.8


 


Total Bilirubin    1.1


 


AST    14 L


 


ALT    34


 


Alkaline Phosphatase    54


 


Ammonia  16.2  


 


Total Protein    6.4


 


Albumin    4.1


 


Triglycerides    141


 


Cholesterol    245.50 H


 


LDL Cholesterol Direct    169 H


 


VLDL Cholesterol    28.0


 


HDL Cholesterol    43








 











  02/20/18 02/20/18 02/21/18





  21:30 21:30 03:35


 


Creatine Kinase  108  


 


CK-MB (CK-2)   1.44  1.64


 


Troponin I   0.136  0.168


 


NT-Pro-B Natriuret Pep    2600 H














  02/21/18 02/21/18 02/21/18





  03:35 11:11 11:11


 


Creatine Kinase  108  111 


 


CK-MB (CK-2)    1.83


 


Troponin I    0.124


 


NT-Pro-B Natriuret Pep   











Impressions: 


 





Chest X-Ray  02/20/18 14:59


IMPRESSION:  Stable cardiomegaly.  No active pulmonary disease.


 














Assessment & Plan





- Diagnosis


(1) Non-ST elevated myocardial infarction (non-STEMI)


Is this a current diagnosis for this admission?: Yes   


Plan: 


Patient is admitted to the hospital started on antiplatelet Lovenox etc. the 

elevated troponin could be from non-STEMI all from a leak from his 

cardiomyopathy in the setting of hypertensive emergency








(2) Hypertensive emergency


Is this a current diagnosis for this admission?: Yes   


Plan: 


Continue nitroglycerin this will control blood pressure and also chest symptoms








(3) AICD (automatic cardioverter/defibrillator) present


Is this a current diagnosis for this admission?: Yes   





(4) Coronary artery disease


Qualifiers: 


   Coronary Disease-Associated Artery/Lesion type: unspecified vessel or lesion 

type   Associated angina: angina presence unspecified 


Is this a current diagnosis for this admission?: Yes   





(5) Type 2 diabetes mellitus


Qualifiers: 


   Diabetes mellitus complication status: with neurologic complications   

Diabetes mellitus complication detail: with polyneuropathy   Diabetes mellitus 

long term insulin use: without long term use   Qualified Code(s): E11.42 - Type 

2 diabetes mellitus with diabetic polyneuropathy   


Is this a current diagnosis for this admission?: Yes

## 2018-02-21 NOTE — PDOC CONSULTATION
Consultation


Consult Date: 02/21/18


Attending physician:: PREETI THOMSON


Consult reason:: Chest pain and shortness of breath





History of Present Illness


Admission Date/PCP: 


  02/20/18 19:51





  PREETI THOMSON MD





Patient complains of: Chest pain


History of Present Illness: 


The patient is a 65-year-old male, past medical history CHF, HTN, CAD, presents 

after an episode of mild chest pressure this morning that began at rest.  

Patient claims that yesterday morning he did not feel well and was very weak 

even to walk.  Patient subsequently took his blood pressure and it was noted to 

be extremely high at 237/107 or so.  Patient called his daughter and 

subsequently he was taken to Dr. Thomson's office where he did an EKG and 

sent patient to the ER to be admitted.  Patient claims that he was given some 

nitroglycerin.  Subsequently EMS were called and he was sent to the emergency 

room.  He was also having some shortness of breath.  Patient took 2 nitro and 

his chest pain went away, but he is still feeling short of breath.  EMS placed 

him on 1 L nasal cannula and on ER arrival he feels much better.


This history was reviewed and confirmed.  Since patient admission, he has had 

no recurrence of chest pain.  His blood pressure was noted to be high at home 

and in doctor's office as well as in the ER but this morning it is well 

controlled.  It seems patient medical regimen has been optimized.  Patient 

currently on nitroglycerin drip.  Subsequently his cardiac enzymes came back 

suggestive and been asked to see this patient.  Patient claims he is on statin 

therapy but his LDL and total cholesterol was still quite high.





Past Medical History


Cardiac Medical History: Reports: Congestive Heart Failure, Coronary Artery 

Disease, Myocardial Infarction - six, Hyperlipidema, Hypertension


Pulmonary Medical History: Reports: Asthma, Bronchitis, Chronic Obstructive 

Pulmonary Disease (COPD), Pneumonia


Neurological Medical History: 


   Denies: Seizures


Endocrine Medical History: Reports: Diabetes Mellitus Type 2


GI Medical History: Reports: Gastroesophageal Reflux Disease


Musculoskeltal Medical History: 


   Denies: Arthritis


Psychiatric Medical History: 


   Denies: Depression


Hematology: 


   Denies: Anemia





Past Surgical History


Past Surgical History: Reports: Cardiac Catheterization, Coronary Artery Bypass 

Graft - x 3, Coronary Stent - x 8, Pacemaker, Tonsillectomy





Social History


Information Source: Patient


Smoking Status: Former Smoker


Frequency of Alcohol Use: None


Hx Recreational Drug Use: No


Drugs: None


Hx Prescription Drug Abuse: No





- Advance Directive


Resuscitation Status: Full Code


Surrogate healthcare decision maker:: 





Patient's children at the surrogate decision-maker





Family History


Family History: Hypertension.  denies: CAD


Parental Family History Reviewed: Yes


Children Family History Reviewed: Yes


Sibling(s) Family History Reviewed.: Yes





Medication/Allergy


Home Medications: 








Atorvastatin Calcium [Lipitor 40 mg Tablet] 40 mg PO QHS 02/20/18 


Carvedilol [Coreg 25 mg Tablet] 1 tab PO BID 02/20/18 


Hydralazine HCl [Apresoline 25 mg Tablet] 25 mg PO TID 02/20/18 


Isosorb Dinit/Hydralazine HCl [Bidil 20-37.5 mg Tablet] 1 tab PO TID 02/20/18 


Isosorbide Mononitrate [Isosorbide Mononitrate ER] 30 mg PO DAILY 02/20/18 


Lisinopril [Zestril] 40 mg PO DAILY 02/20/18 


Ranolazine [Ranexa] 1,000 mg PO BID 02/20/18 


Sitagliptin Phos/Metformin HCl [Janumet 50-1,000 Mg Tablet] 1 each PO DAILY 02/ 20/18 


Ticagrelor [Brilinta 90 mg Tablet] 90 mg PO DAILY 02/20/18 








Allergies/Adverse Reactions: 


 





Iodinated Contrast- Oral and IV Dye [IV Dye, Iodine Containing] Allergy (

Verified 08/15/15 06:29)


 











Review of Systems


Review of Systems: 





Please see history of present illness and past medical history as wall.


Constitutional: No fever or chills reported.  Patient has noted some recent 

fatigue and tiredness.


Head : No recent chronic headaches, recent head injury.


Eyes: No recent eye pain, diplopia, redness, discharge, acute visual changes.


Ears: No recent chronic ear pain, acute hearing loss, ear discharge.


Oral cavity: No recent  ulcerations, bleeding, oral cavity discomfort.


Neck: No recent acute neck pain reported.


Hematologic: No recent easy bruising or bleeding or hematologic malignancy 

reported.


Lymphatic: No recent lymphatic malignancy, chronic lymphadenopathy reported yet


Cardiovascular system review: See history of present illness.


Respiratory system review: No recent chronic cough, hemoptysis, blood clots in 

the lungs reported. Mild Shortness of breath on exertion


Gastrointestinal system review: Negative for any recent acute or chronic 

abdominal pain, hematemesis, melena, recent change in bowel habits.  


Genitourinary system review: No recent acute or chronic hematuria, flank pain, 

UTI etc. reported.


Skin system review: Negative for any recent abnormal bruising, no rash, no 

pruritus reported.


Neurologic: No prior history of strokes, mini strokes, seizure disorder.


Psychologic: No history of major psychosis or major depression reported.


Musculoskeletal: Minor aches and pains reported.  No acute joint swelling 

reported.


Endocrine: No recent polyuria, polydipsia, recent heat or cold intolerance.





Physical Exam


Vital Signs: 


 











Temp Pulse Resp BP Pulse Ox


 


 98.2 F   83   18   123/66   100 


 


 02/21/18 03:06  02/21/18 08:29  02/21/18 03:06  02/21/18 11:01  02/21/18 11:01








 Intake & Output











 02/20/18 02/21/18 02/22/18





 06:59 06:59 06:59


 


Intake Total  45 


 


Output Total  0 


 


Balance  45 


 


Weight  109.1 kg 











Exam: 








GENERAL: well-nourished and in no acute distress.  Alert and oriented x3


HEAD: Atraumatic, normocephalic.


EYES: Pupils equal round and reactive to light, extraocular movements intact, 

sclera anicteric, conjunctiva are normal.


ENT: TMs normal, nares patent, oropharynx clear without exudates. Moist mucous 

membranes.  No oral ulcerations or bleeding gums noted


NECK: supple without lymphadenopathy.  Trachea is central.  No cervical or 

axillary lymphadenopathy noted.  Carotids are 2+, JVD WNL 


LUNGS: Respiration seems nonlabored, no significant accessory muscle action 

noted.  Breath sounds clear to auscultation bilaterally and equal noted. No 

wheezes rales or rhonchi noted.  No significant dullness noted on percussion.


CHEST: Palpation of the chest wall shows no significant chest wall tenderness.  

No other significant abnormalities noted.


HEART: [place] NP, No PSH,  1/6 ROSA aortic area, 1/6 pan systolic murmur mitral 

area, no rubs, no gallops.


ABDOMEN: Soft, no significant tenderness appreciated, normoactive bowel sounds. 

No guarding, no rebound.  No rigidity noted . No masses appreciated.


EXTREMITIES: Pedal pulses are 1-2+, no calf tenderness noted. No clubbing or 

cyanosis.trace to 1+ pedal edema noted


NEUROLOGICAL: Focused neurological exam showed no significant neurologic 

deficit. Normal speech, no focal weakness appreciated. 


PSYCH: Normal mood, normal affect.  Judgment and insight within normal limits.


SKIN: No significant ecchymosis, rash, ulcerations or signs of pruritus noted.


MUSCULOSKELETAL EXAM: No significant joint swelling noted.





Results


Laboratory Results: 


 





 02/21/18 03:35 





 02/21/18 03:35 





 











  02/20/18 02/21/18 02/21/18





  21:30 03:35 03:35


 


WBC   6.2 


 


RBC   5.66 H 


 


Hgb   16.2 


 


Hct   48.4 


 


MCV   85 


 


MCH   28.5 


 


MCHC   33.4 


 


RDW   14.8 H 


 


Plt Count   167 


 


Seg Neutrophils %   56.8 


 


Lymphocytes %   26.8 


 


Monocytes %   11.6 


 


Eosinophils %   4.5 


 


Basophils %   0.3 


 


Absolute Neutrophils   3.5 


 


Absolute Lymphocytes   1.7 


 


Absolute Monocytes   0.7 


 


Absolute Eosinophils   0.3 


 


Absolute Basophils   0.0 


 


Sodium    142.0


 


Potassium    4.0


 


Chloride    106


 


Carbon Dioxide    20 L


 


Anion Gap    16


 


BUN    16


 


Creatinine    1.12


 


Est GFR ( Amer)    > 60


 


Est GFR (Non-Af Amer)    > 60


 


Glucose    78


 


Calcium    8.8


 


Total Bilirubin    1.1


 


AST    14 L


 


ALT    34


 


Alkaline Phosphatase    54


 


Ammonia  16.2  


 


Total Protein    6.4


 


Albumin    4.1


 


Triglycerides    141


 


Cholesterol    245.50 H


 


LDL Cholesterol Direct    169 H


 


VLDL Cholesterol    28.0


 


HDL Cholesterol    43








 











  02/20/18 02/20/18 02/21/18





  21:30 21:30 03:35


 


Creatine Kinase  108  


 


CK-MB (CK-2)   1.44  1.64


 


Troponin I   0.136  0.168


 


NT-Pro-B Natriuret Pep    2600 H














  02/21/18 02/21/18 02/21/18





  03:35 11:11 11:11


 


Creatine Kinase  108  111 


 


CK-MB (CK-2)    1.83


 


Troponin I    0.124


 


NT-Pro-B Natriuret Pep   











EKG Comments: 





Twelve-lead EKG shows no significant ST-T wave changes.  Evidence of prior 

inferior myocardial infarction noted.


Impressions: 


 





Chest X-Ray  02/20/18 14:59


IMPRESSION:  Stable cardiomegaly.  No active pulmonary disease.


 














Assessment & Plan





- Diagnosis


(1) Hypertensive emergency


Is this a current diagnosis for this admission?: Yes   





(2) Chest pain


Qualifiers: 


   Chest pain type: unspecified   Qualified Code(s): R07.9 - Chest pain, 

unspecified   


Is this a current diagnosis for this admission?: Yes   





(3) NSTEMI (non-ST elevated myocardial infarction)


Is this a current diagnosis for this admission?: Yes   





(4) Dyslipidemia


Is this a current diagnosis for this admission?: Yes   





(5) Cardiac defibrillator in situ


Is this a current diagnosis for this admission?: Yes   





(6) Congestive heart failure


Is this a current diagnosis for this admission?: Yes   





(7) Coronary artery disease


Qualifiers: 


   Coronary Disease-Associated Artery/Lesion type: unspecified vessel or lesion 

type   Associated angina: angina presence unspecified 


Is this a current diagnosis for this admission?: Yes   





(8) Ischemic cardiomyopathy


Is this a current diagnosis for this admission?: Yes   





(9) Type 2 diabetes mellitus


Qualifiers: 


   Diabetes mellitus complication status: with unspecified complications   

Diabetes mellitus long term insulin use: without long term use   Qualified Code(

s): E11.8 - Type 2 diabetes mellitus with unspecified complications   


Is this a current diagnosis for this admission?: Yes   





- Notes


Notes: 





Hypertensive emergency: Patient blood pressure was noted to be extremely high.  

Dr. Thomson's note not available.  EKG not showing any acute ST-T wave 

changes.  Troponin high could be related to just severe hypertension.  Exact 

etiology of severe hypertension not clear.  Patient maintains compliance with 

medication.  At this point will optimize patient's antihypertensive therapy.


Chest pain: Currently stable.  Previous stress test report was reviewed.  

Patient tells me that he had coronary artery bypass graft surgery in 2005.  His 

last invasive cardiac procedure was about 2 years ago at [facility] 

but he had stents placed.  Will try to obtain those records.  At this point 

will consider repeating the stress test.  This will be done after optimizing 

medical management.


Non-STEMI: This is based on chest pain and positive troponin I.  Probably 

brought on by supply demand mismatch from severe hypertension but cannot rule 

out acute coronary syndrome.


CHF: Currently compensated.  Recommend optimization of management of CHF.


Coronary artery disease: Currently stable.  Medical management is being 

optimized.


Cardiomyopathy: Patient has primary prevention single-chamber defibrillator in 

place.


Diabetes: Recommend adequate management but avoid any hypo-or hyperglycemia.


Dyslipidemia: LDL goal is less than 70 in this patient.  Will increase Lipitor 

to total of 80 mg p.o. nightly.  May add Zetia if needed.





- Time


Time Spent: 50 to 70 Minutes - CODE STATUS was discussed, patient remains full 

code.  Surrogate decision-maker unchanged.  Multiple medical problems were 

addressed.  More than 50% of the time spent coordinating care, discussing 

management plans with involved caregivers.  Management plans discussed with 

involved personnels.  Medical decision making was of moderate to high complexity

, patient's has multiple  comorbidities.


Medications reviewed and adjusted accordingly: Yes

## 2018-02-22 NOTE — XCELERA REPORT
[address] [address]

                             Tel: [phone]

                             Fax: [phone]



                    Transthoracic Echocardiogram Report

____________________________________________________________________________



Name: FRIDA YAP JR

MRN: [id number]                Age: 65 yrs

Gender: Male                   : 1953

Patient Status: Inpatient      Patient Location: [address] [place], [place] [address]

Account #: [id number]

Study Date: 2018 03:41 PM

Accession #: [id number]

____________________________________________________________________________



Height: 73 in        Weight: 240 lb        BSA: 2.3 m2



____________________________________________________________________________



Reason For Study: Non-STEMI





Ordering Physician: PATRICE ISBELL

Performed By: Alda Cortes

____________________________________________________________________________





Interpretation Summary

The Ejection Fraction estimate is 35-40%

Left ventricular systolic function is moderately reduced.

There is mild to moderate concentric left ventricular hypertrophy.

The left ventricle is grossly normal size.

Doppler measurements suggest pseudonormalized left ventricular relaxation,

which is associated with grade II/IV or mild to moderate diastolic

dysfunction

Apical wall motion abnormality may reflect pacemaker activation

Cannot r/o apical mild dyskinesia

The right ventricle is mildly dilated.

The right ventricular systolic function is normal.

The left atrium is moderately dilated.

The right atrium is mild to moderately dilated.

There is a mild amount of mitral regurgitation

There is no mitral valve stenosis.

No aortic regurgitation is present.

There is no aortic valve stenosis

There is a trace or physiologic amount of tricuspid regurgitation

Tricuspid regurgitation jet envelope not well defined to measure RV

systolic pressure accurately.

The aortic root is not well visualized but is probably normal size.

The inferior vena cava was not well visualized

There is no pericardial effusion.



____________________________________________________________________________



MMode/2D Measurements & Calculations

RVDd: 3.0 cm          LVIDd: 6.4 cm      FS: 14.0 %           EPSS: 1.5 cm

IVSd: 1.1 cm          LVIDs: 5.5 cm      EDV(Teich): 210.7 ml

                      LVPWd: 1.0 cm      ESV(Teich): 149.2 ml

                                         EF(Teich): 29.2 %



        _____________________________________________________________

Ao root diam: 3.2 cm  LVOT diam: 2.5 cm

Ao root area: 7.8 cm2 LVOT area: 4.7 cm2





Doppler Measurements & Calculations

MV E max flor:      MV dec slope:       Ao V2 max:        LV V1 max P.3 cm/sec        490.7 cm/sec2       172.5 cm/sec      4.8 mmHg

MV A max flor:      MV dec time:        Ao max PG:        LV V1 max:

34.8 cm/sec        0.19 sec            11.9 mmHg         109.0 cm/sec

MV E/A: 2.7                            RUY(V,D): 3.0 cm2

        _____________________________________________________________



PA V2 max:         TR max flor:

97.3 cm/sec        188.7 cm/sec

PA max PG:         TR max P.2 mmHg

3.8 mmHg

____________________________________________________________________________



Left Ventricle

The left ventricle is grossly normal size. There is mild to moderate

concentric left ventricular hypertrophy. Left ventricular systolic function

is moderately reduced. The Ejection Fraction estimate is 35-40%. Doppler

measurements suggest pseudonormalized left ventricular relaxation, which is

associated with grade II/IV or mild to moderate diastolic dysfunction.

Apical wall motion abnormality may reflect pacemaker activation. Cannot r/o

apical mild dyskinesia.



Right Ventricle

The right ventricle is mildly dilated. There is normal right ventricular

wall thickness. The right ventricular systolic function is normal.



Atria

The right atrium is mild to moderately dilated. The left atrium is

moderately dilated. Interarterial septum not well visualized and not well

dopplered. Cannot comment on ASD/PFO presence.



Mitral Valve

The mitral valve is grossly normal. There is no mitral valve stenosis.

There is a mild amount of mitral regurgitation.



Aortic Valve

The aortic valve is grossly normal. There is no aortic valve stenosis. No

aortic regurgitation is present.





Tricuspid Valve

The tricuspid valve is not well visualized, but is grossly normal. There is

no tricuspid stenosis. There is a trace or physiologic amount of tricuspid

regurgitation. Tricuspid regurgitation jet envelope not well defined to

measure RV systolic pressure accurately.



Pulmonic Valve

The pulmonic valve is not well visualized.



Great Vessels

The aortic root is not well visualized but is probably normal size. The

inferior vena cava was not well visualized.



Effusions

There is no pericardial effusion.



Incidental Findings

Pacemaker wire noted.





____________________________________________________________________________

Electronically signed by:      Patrice Isbell      on 2018 05:57 PM



CC: PATRICE ISBELL

>

Patrice Isbell

## 2018-02-22 NOTE — PHYSICIAN ADVISORY NOTE
Physician Advisor ProgressNote


.: 


Pursuant to the  plan for [facility], I have reviewed the medical record 

for this patient.  





Physician Advisor Statement: 





Please consider documenting, if you agree:


1.  "hypertensive emergency causing _______"  (the MI? - need to explicitly 

state what organ dysfunction is caused by hypertensive emergency)


2.  NSTEMI:  please specify most likely wall (ant/inf/other) & coronary artery (

LAD, RCA, Lt circumflex, ...) involved








Thanks!


CK

## 2018-02-22 NOTE — PDOC PROGRESS REPORT
Subjective


Progress Note for:: 02/22/18


Subjective:: 





Patient seems to be doing better with gradual improvement.  Pt is denying any 

chest arm or neck discomfort.  Patient denying any PND, orthopnea.  Patient 

denied any sustained palpitations, dizziness, syncope, near syncope.  Patient 

denying any fever chills.  Patient denying any other significant discomfort.  





Patient is maintaining sinus rhythm.





Review of systems: Rest review of systems negative.





Medications: Medications have been reviewed.


Reason For Visit: 


HYPERTENSIVE EMERGENCY, NSTMI CAD, MULTIPLE








Physical Exam


Vital Signs: 


 











Temp Pulse Resp BP Pulse Ox


 


 99.1 F   89   20   120/68   99 


 


 02/22/18 16:23  02/22/18 16:23  02/22/18 16:23  02/22/18 16:23  02/22/18 16:23








 Intake & Output











 02/21/18 02/22/18 02/23/18





 06:59 06:59 06:59


 


Intake Total 45 767 138


 


Output Total 0  


 


Balance 45 767 138


 


Weight 109.1 kg 110.2 kg 











Exam: 








GENERAL: well-nourished and in no acute distress.  Alert and oriented x3


HEAD: Atraumatic, normocephalic.


EYES: Pupils equal round and reactive to light, extraocular movements intact, 

sclera anicteric, conjunctiva are normal.


ENT: TMs normal, nares patent, oropharynx clear without exudates. Moist mucous 

membranes.  No oral ulcerations or bleeding gums noted


NECK: supple without lymphadenopathy.  Trachea is central.  No cervical or 

axillary lymphadenopathy noted.  Carotids are 2+, JVD WNL 


LUNGS: Respiration seems nonlabored, no significant accessory muscle action 

noted.  Breath sounds clear to auscultation bilaterally and equal noted. No 

wheezes rales or rhonchi noted.  No significant dullness noted on percussion.


CHEST: Palpation of the chest wall shows no significant chest wall tenderness.  

No other significant abnormalities noted.  Defibrillator noted in place.


HEART: [place] NP, No PSH,  1/6 ROSA aortic area, 1/6 pan systolic murmur mitral 

area, no rubs, no gallops.


ABDOMEN: Soft, no significant tenderness appreciated, normoactive bowel sounds. 

No guarding, no rebound.  No rigidity noted . No masses appreciated.


EXTREMITIES: Pedal pulses are 1-2+, no calf tenderness noted. No clubbing or 

cyanosis.trace to 1+ pedal edema noted


NEUROLOGICAL: Focused neurological exam showed no significant neurologic 

deficit. Normal speech, no focal weakness appreciated. 


PSYCH: Normal mood, normal affect.  Judgment and insight within normal limits.


SKIN: No significant ecchymosis, rash, ulcerations or signs of pruritus noted.


MUSCULOSKELETAL EXAM: No significant joint swelling noted.





Results


Laboratory Results: 


 





 02/22/18 05:06 





 02/22/18 05:06 





 











  02/22/18 02/22/18





  05:06 05:06


 


WBC  6.3 


 


RBC  5.38 


 


Hgb  15.5 


 


Hct  45.8 


 


MCV  85 


 


MCH  28.9 


 


MCHC  33.9 


 


RDW  14.7 H 


 


Plt Count  171 


 


Seg Neutrophils %  73.7 


 


Lymphocytes %  17.6 


 


Monocytes %  7.9 


 


Eosinophils %  0.5 


 


Basophils %  0.3 


 


Absolute Neutrophils  4.6 


 


Absolute Lymphocytes  1.1 


 


Absolute Monocytes  0.5 


 


Absolute Eosinophils  0.0 


 


Absolute Basophils  0.0 


 


Sodium   140.8


 


Potassium   4.2


 


Chloride   107


 


Carbon Dioxide   19 L


 


Anion Gap   15


 


BUN   14


 


Creatinine   1.16


 


Est GFR ( Amer)   > 60


 


Est GFR (Non-Af Amer)   > 60


 


Glucose   99


 


Calcium   9.5


 


Total Bilirubin   1.2


 


AST   18


 


ALT   25


 


Alkaline Phosphatase   56


 


Total Protein   6.8


 


Albumin   4.1








 











  02/20/18 02/20/18 02/21/18





  21:30 21:30 03:35


 


Creatine Kinase  108  


 


CK-MB (CK-2)   1.44  1.64


 


Troponin I   0.136  0.168


 


NT-Pro-B Natriuret Pep    2600 H














  02/21/18 02/21/18 02/21/18





  03:35 11:11 11:11


 


Creatine Kinase  108  111 


 


CK-MB (CK-2)    1.83


 


Troponin I    0.124


 


NT-Pro-B Natriuret Pep   














  02/22/18





  05:06


 


Creatine Kinase 


 


CK-MB (CK-2) 


 


Troponin I 


 


NT-Pro-B Natriuret Pep  967 H











EKG Comments: 





Telemetry strips shows sinus rhythm without any sustained tacky or bradycardia 

arrhythmias.


Impressions: 


 





Chest X-Ray  02/20/18 14:59


IMPRESSION:  Stable cardiomegaly.  No active pulmonary disease.


 














Assessment & Plan





- Diagnosis


(1) Hypertensive emergency


Is this a current diagnosis for this admission?: Yes   





(2) Chest pain


Qualifiers: 


   Chest pain type: unspecified   Qualified Code(s): R07.9 - Chest pain, 

unspecified   


Is this a current diagnosis for this admission?: Yes   





(3) NSTEMI (non-ST elevated myocardial infarction)


Is this a current diagnosis for this admission?: Yes   





(4) Dyslipidemia


Is this a current diagnosis for this admission?: Yes   





(5) Cardiac defibrillator in situ


Is this a current diagnosis for this admission?: Yes   





(6) Congestive heart failure


Is this a current diagnosis for this admission?: Yes   





(7) Coronary artery disease


Qualifiers: 


   Coronary Disease-Associated Artery/Lesion type: unspecified vessel or lesion 

type   Associated angina: angina presence unspecified 


Is this a current diagnosis for this admission?: Yes   





(8) Ischemic cardiomyopathy


Is this a current diagnosis for this admission?: Yes   





(9) Type 2 diabetes mellitus


Qualifiers: 


   Diabetes mellitus complication status: with unspecified complications   

Diabetes mellitus long term insulin use: without long term use   Qualified Code(

s): E11.8 - Type 2 diabetes mellitus with unspecified complications   


Is this a current diagnosis for this admission?: Yes   





- Notes


Notes: 


In view of continuing depressed LVEF although better than before, consider 

switch to entresto therapy.


Hypertensive emergency: Patient blood pressure was noted to be extremely high.  

Dr. Ngo's note reviewed.  Blood pressure in the office was over 200 

mmHg..  EKG not showing any acute ST-T wave changes.  Troponin high could be 

related to just severe hypertension.  Exact etiology of severe hypertension not 

clear.  Patient maintains compliance with medication.  At this point will 

optimize patient's antihypertensive therapy.  Patient currently doing 

reasonably well on medical management without any recurrence of chest pain.


Chest pain: Currently stable.  Previous stress test report was reviewed.  

Patient tells me that he had coronary artery bypass graft surgery in 2005.  His 

last invasive cardiac procedure was about 2 years ago at [facility] 

but he had stents placed.  Will try to obtain those records.  


Non-STEMI: This is based on chest pain and positive troponin I.  Probably 

brought on by supply demand mismatch from severe hypertension but cannot rule 

out acute coronary syndrome.  2D echo obtained showed LVEF seems improved from 

last echocardiogram.  Continue current medical management.


CHF: Currently compensated.  Recommend optimization of management of CHF.


Coronary artery disease: Currently stable.  Medical management is being 

optimized.


Cardiomyopathy: Patient has primary prevention single-chamber defibrillator in 

place.


Diabetes: Recommend adequate management but avoid any hypo-or hyperglycemia.


Dyslipidemia: LDL goal is less than 70 in this patient.  Will increase Lipitor 

to total of 80 mg p.o. nightly.  May add Zetia if needed.  Recommend lipid 

panel prior to discharge so that we can assess patient's compliance as 

outpatient.








- Time


Time with patient: Greater than 35 minutes - CODE STATUS was discussed, patient 

remains full code.  Surrogate decision-maker unchanged.  Multiple medical 

problems were addressed.  More than 50% of the time spent coordinating care, 

discussing management plans with involved caregivers.  Management plans 

discussed with involved personnels.  Medical decision making was of moderate to 

high complexity, patient's has multiple  comorbidities.


Medications reviewed and adjusted accordingly: Yes

## 2018-02-22 NOTE — PDOC PROGRESS REPORT
Subjective


Progress Note for:: 02/22/18


Subjective:: 





Patient is seen by the bedside improving,He is scheduled for a nuclear stress 

test in the morning


Reason For Visit: 


HYPERTENSIVE EMERGENCY, NSTMI CAD, MULTIPLE








Physical Exam


Vital Signs: 


 











Temp Pulse Resp BP Pulse Ox


 


 98.6 F   90   20   134/83 H  99 


 


 02/22/18 20:11  02/22/18 20:11  02/22/18 20:11  02/22/18 20:11  02/22/18 20:11








 Intake & Output











 02/21/18 02/22/18 02/23/18





 06:59 06:59 06:59


 


Intake Total 45 767 675


 


Output Total 0  


 


Balance 45 767 675


 


Weight 109.1 kg 110.2 kg 











General appearance: PRESENT: no acute distress, well-developed, well-nourished


Head exam: PRESENT: atraumatic, normocephalic


Eye exam: PRESENT: conjunctiva pink, EOMI, PERRLA


Ear exam: PRESENT: normal external ear exam


Mouth exam: PRESENT: moist, tongue midline


Neck exam: PRESENT: full ROM


Cardiovascular exam: PRESENT: RRR, +S1, +S2


Pulses: PRESENT: normal dorsalis pedis pul, +2 pedal pulses bilateral


Vascular exam: PRESENT: normal capillary refill


GI/Abdominal exam: PRESENT: normal bowel sounds, soft


Rectal exam: PRESENT: deferred


Neurological exam: PRESENT: alert


Psychiatric exam: PRESENT: appropriate affect, normal mood.  ABSENT: homicidal 

ideation, suicidal ideation


Skin exam: PRESENT: dry, intact, warm





Results


Laboratory Results: 


 





 02/22/18 05:06 





 02/22/18 05:06 





 











  02/22/18 02/22/18





  05:06 05:06


 


WBC  6.3 


 


RBC  5.38 


 


Hgb  15.5 


 


Hct  45.8 


 


MCV  85 


 


MCH  28.9 


 


MCHC  33.9 


 


RDW  14.7 H 


 


Plt Count  171 


 


Seg Neutrophils %  73.7 


 


Lymphocytes %  17.6 


 


Monocytes %  7.9 


 


Eosinophils %  0.5 


 


Basophils %  0.3 


 


Absolute Neutrophils  4.6 


 


Absolute Lymphocytes  1.1 


 


Absolute Monocytes  0.5 


 


Absolute Eosinophils  0.0 


 


Absolute Basophils  0.0 


 


Sodium   140.8


 


Potassium   4.2


 


Chloride   107


 


Carbon Dioxide   19 L


 


Anion Gap   15


 


BUN   14


 


Creatinine   1.16


 


Est GFR ( Amer)   > 60


 


Est GFR (Non-Af Amer)   > 60


 


Glucose   99


 


Calcium   9.5


 


Total Bilirubin   1.2


 


AST   18


 


ALT   25


 


Alkaline Phosphatase   56


 


Total Protein   6.8


 


Albumin   4.1








 











  02/20/18 02/20/18 02/21/18





  21:30 21:30 03:35


 


Creatine Kinase  108  


 


CK-MB (CK-2)   1.44  1.64


 


Troponin I   0.136  0.168


 


NT-Pro-B Natriuret Pep    2600 H














  02/21/18 02/21/18 02/21/18





  03:35 11:11 11:11


 


Creatine Kinase  108  111 


 


CK-MB (CK-2)    1.83


 


Troponin I    0.124


 


NT-Pro-B Natriuret Pep   














  02/22/18





  05:06


 


Creatine Kinase 


 


CK-MB (CK-2) 


 


Troponin I 


 


NT-Pro-B Natriuret Pep  967 H











Impressions: 


 





Chest X-Ray  02/20/18 14:59


IMPRESSION:  Stable cardiomegaly.  No active pulmonary disease.


 














Assessment & Plan





- Diagnosis


(1) Non-ST elevated myocardial infarction (non-STEMI)


Is this a current diagnosis for this admission?: Yes   





(2) Hypertensive emergency


Is this a current diagnosis for this admission?: Yes   





(3) AICD (automatic cardioverter/defibrillator) present


Is this a current diagnosis for this admission?: Yes   





(4) Coronary artery disease


Qualifiers: 


   Coronary Disease-Associated Artery/Lesion type: unspecified vessel or lesion 

type   Associated angina: angina presence unspecified 


Is this a current diagnosis for this admission?: Yes   





(5) Type 2 diabetes mellitus


Qualifiers: 


   Diabetes mellitus complication status: with neurologic complications   

Diabetes mellitus complication detail: with polyneuropathy   Diabetes mellitus 

long term insulin use: without long term use   Qualified Code(s): E11.42 - Type 

2 diabetes mellitus with diabetic polyneuropathy   


Is this a current diagnosis for this admission?: Yes

## 2018-02-23 NOTE — PROGRESS NOTE
Provider Note


Provider Note: 





Nuclear stress test results were reviewed with the patient.  This was also 

compared with previous nuclear stress test.  Do not feel there is significant 

changes.  Patient predominantly has severe fixed defect involving the LV apex 

and inferolateral wall of the left ventricle.  No definite ischemia noted.  

Recommend aggressive risk factor modification and management.  Will have low 

threshold for cardiac cath if patient continues to have significant symptoms.  

At this point will just recommend very close outpatient observation and 

optimization of medical therapy.  Discussed with Dr. Ngo.

## 2018-02-23 NOTE — DRAGON STRESS TEST REPORT
INTRAVENOUS LEXISCAN CARDIOLITE STRESS TEST USING SINGLE PHOTON EMMISION 
COMPUTERIZED TOMOGRAPHIC.



DATE OF PROCEDURE: February 23, 2018, 

INDICATION : Non-STEMI, elevated troponin I



CARDIAC RISK FACTORS: Hypertension, dyslipidemia



RESTING EKG: Sinus rhythm with nonspecific IVCD

STRESS EKG: No significant changes noted with LexiScan bolus 



REASON FOR TERMINATION: Protocol.





PROCEDURE REPORT: Baseline heart rate 88 beats per minute with blood pressure 
of 133/80.  Patient had no significant complaints.  

Heart rate at 2 minutes post bolus 104 with a blood pressure of 120/77.  

3 minutes post bolus heart rate 99 with blood pressure of 130/78.  

No significant EKG changes were noted.  Patient had no significant complaints 
during the procedure or postprocedure. 

Patient injected with Aminophyllin 75 mg at 3 minutes or later after Lexiscan 
bolus.



CONCLUSIONS: Normal EKG and hemodynamic response to IV LexiScan.







NUCLEAR DATA: 

At rest the patient was given 13.83 millicuries of technetium 99 sestamibi 
injected intravenously.  As per protocol rest gated SPECT images were obtained.
  On day of stress test, the patient was given intravenous LexiScan at a dose 
of 0.4 mg in 5 mL intravenously, followed by flush with normal saline.  
Subsequently the stress dose of 42.3 millicuries of technetium 99 sestamibi was 
injected intravenously.  As per protocol stress gated images were obtained.  



NUCLEAR INTERPRETATION: Both raw and processed data were used for 
interpretation.  Visual, qualitative, computer-generated quantitative data was 
used.  There was good myocardial uptake of technetium compound.  Motion 
artifact and soft tissue attenuations were noted.  Increased visceral uptake 
was noted.  No definitive areas of transient perfusion defect noted, No 
definitive areas of fixed perfusion defect or scars noted.



EKG gated imaging showed LV EF at 16 %, rest and stress gated EF similar 
visually.  Akinesia noted of the inferolateral wall and apex of the left 
ventricle.  T. I D. ratio was 1.08.  Lung heart ratio noted to be within normal 
limits 0.39.  No significant extracardiac and abnormal radiotracer activities 
were noted.  RV free wall uptake was noted to be WNL.



IMPRESSION: Also refer to comments under nuclear interpretation. Also test 
results needs to be interpreted in the context of pretest probability.





1.  No definitive areas of transient perfusion defect noted.  May consider 
viability determination if clinically indicated.

2.  Severe fixed defect indicative of severe scar noted involving the 
inferolateral wall of the left ventricle and LV apex.

3.  EKG gated imaging shows left ventricular ejection fraction of approx. 16 %.
  Akinesia noted of the inferolateral wall of the left ventricle and LV apex.

4.  Clinical correlation requested as occasionally single vessel disease or 
balanced ischemia could be missed. In approximately 10% of the cases Lexiscan 
may not cause adequate vasodilatory stress.



RECOMMENDATIONS: 

Aggressive risk factor modification and medical management.  Further evaluation 
may be needed if continued symptoms or other high risk indicators are noted on 
clinical evaluation.

Close cardiology follow-up is also recommended.

Clinical correlation with echocardiogram derived ejection fraction.

Inability to exercise by itself can lead to increased cardiovascular event 
risks.

Consider cardiology consultation and or follow-up if clinically indicated.

I am available for cardiology evaluation and consultation if requested by the 
primary care MD, unless patient already has a cardiologist.
JOHN

## 2018-02-23 NOTE — PDOC DISCHARGE SUMMARY
General





- Admit/Disc Date/PCP


Admission Date/Primary Care Provider: 


  02/20/18 19:51





  PREETI THOMSON MD





Discharge Date: 02/23/18





- Discharge Diagnosis


(1) Non-ST elevated myocardial infarction (non-STEMI)


Is this a current diagnosis for this admission?: Yes   





(2) Hypertensive emergency


Is this a current diagnosis for this admission?: Yes   





(3) AICD (automatic cardioverter/defibrillator) present


Is this a current diagnosis for this admission?: Yes   





(4) Coronary artery disease


Is this a current diagnosis for this admission?: Yes   





(5) Type 2 diabetes mellitus


Is this a current diagnosis for this admission?: Yes   





- Additional Information


Resuscitation Status: Full Code


Discharge Diet: Cardiac, Diabetic


Discharge Activity: Activity As Tolerated


Prescriptions: 


Atorvastatin Calcium [Lipitor 40 mg Tablet] 40 mg PO QHS #90 tablet


Carvedilol [Coreg 25 mg Tablet] 1 tab PO BID #60 tablet


Hydralazine HCl [Apresoline 25 mg Tablet] 25 mg PO TID #90 tablet


Isosorb Dinit/Hydralazine HCl [Bidil 20-37.5 mg Tablet] 1 tab PO TID #90 tablet


Lisinopril [Zestril] 40 mg PO DAILY #90 tablet


Ranolazine [Ranexa] 1,000 mg PO BID #180 tab.er.12h


Sitagliptin Phos/Metformin HCl [Janumet 50-1,000 mg Tablet] 1 each PO BID #180 

tablet


Ticagrelor [Brilinta 90 mg Tablet] 90 mg PO DAILY #90 tablet


Home Medications: 








Atorvastatin Calcium [Lipitor 40 mg Tablet] 40 mg PO QHS #90 tablet 02/23/18 


Carvedilol [Coreg 25 mg Tablet] 1 tab PO BID #60 tablet 02/23/18 


Hydralazine HCl [Apresoline 25 mg Tablet] 25 mg PO TID #90 tablet 02/23/18 


Isosorb Dinit/Hydralazine HCl [Bidil 20-37.5 mg Tablet] 1 tab PO TID #90 tablet 

02/23/18 


Lisinopril [Zestril] 40 mg PO DAILY #90 tablet 02/23/18 


Ranolazine [Ranexa] 1,000 mg PO BID #180 tab.er.12h 02/23/18 


Sitagliptin Phos/Metformin HCl [Janumet 50-1,000 mg Tablet] 1 each PO BID #180 

tablet 02/23/18 


Ticagrelor [Brilinta 90 mg Tablet] 90 mg PO DAILY #90 tablet 02/23/18 











History of Present Illness


History of Present Illness: 





Patient 65-year-old male with a history of coronary artery disease with 

multiple coronary intervention including CABG and multiple stent placements, 

type 2 diabetes mellitus, ischemic cardiomyopathy.  He came to the office with 

his daughter for evaluation of shortness of breath and chest pain, in the 

office a 12-lead E EKG was done, it showed old infarct but there was no acute 

ST segment elevation to suggest acute MI but the blood pressure recorded was 

over 200, he was given aspirin and rescue squad was called to the office.  He 

was transferred from the office to the emergency room by EMS for evaluation, in 

the emergency room he was evaluated he was found to have elevated troponin, the 

blood pressure was in the emergency hypertensive range, he was started on 

intravenous nitroglycerin infusion and admission was advised.  Patient is well-

known to me from previous encounter and also from the office.





Hospital Course


Hospital Course: 





Patient was admitted for the management of non-ST elevated MI, hypertensive 

emergency.  He was treated with IV nitroglycerin infusion, anticoagulant, he 

was seen by Dr. Mccain, cardiology.  A 2D echo was done and also a nuclear 

stress test, the nuclear stress test is no different from the last stress test 

he had about a year ago.The 2D echo showed ejection fraction of 35-40% of left 

ventricle





Physical Exam


Vital Signs: 


 











Temp Pulse Resp BP Pulse Ox


 


 98.6 F   88   18   123/66   100 


 


 02/23/18 18:56  02/23/18 18:56  02/23/18 18:56  02/23/18 18:56  02/23/18 18:56








 Intake & Output











 02/22/18 02/23/18 02/24/18





 06:59 06:59 06:59


 


Intake Total 767 1215 10


 


Balance 767 1215 10


 


Weight 110.2 kg 110.3 kg 











General appearance: PRESENT: no acute distress, well-developed, well-nourished


Head exam: PRESENT: atraumatic, normocephalic


Eye exam: PRESENT: conjunctiva pink, EOMI, PERRLA


Ear exam: PRESENT: normal external ear exam


Mouth exam: PRESENT: moist, tongue midline


Neck exam: PRESENT: full ROM


Respiratory exam: PRESENT: clear to auscultation nanda


Cardiovascular exam: PRESENT: RRR, +S1, +S2


Pulses: PRESENT: normal dorsalis pedis pul, +2 pedal pulses bilateral


Vascular exam: PRESENT: normal capillary refill


GI/Abdominal exam: PRESENT: normal bowel sounds, soft


Rectal exam: PRESENT: deferred


Neurological exam: PRESENT: alert, awake, oriented to person, oriented to place

, oriented to time, oriented to situation, CN II-XII grossly intact


Psychiatric exam: PRESENT: appropriate affect, normal mood


Skin exam: PRESENT: dry, intact, warm





Results


Laboratory Results: 


 





 02/23/18 05:31 





 02/23/18 05:31 





 











  02/23/18 02/23/18 02/23/18





  05:31 05:31 05:31


 


WBC  6.7  


 


RBC  5.54  


 


Hgb  16.1  


 


Hct  47.0  


 


MCV  85  


 


MCH  29.0  


 


MCHC  34.2  


 


RDW  15.3 H  


 


Plt Count  180  


 


Seg Neutrophils %  66.5  


 


Lymphocytes %  20.6  


 


Monocytes %  10.2  


 


Eosinophils %  2.2  


 


Basophils %  0.5  


 


Absolute Neutrophils  4.5  


 


Absolute Lymphocytes  1.4  


 


Absolute Monocytes  0.7  


 


Absolute Eosinophils  0.1  


 


Absolute Basophils  0.0  


 


Sodium   139.2 


 


Potassium   4.2 


 


Chloride   108 H 


 


Carbon Dioxide   21 L 


 


Anion Gap   10 


 


BUN   17 


 


Creatinine   1.36 H 


 


Est GFR ( Amer)   > 60 


 


Est GFR (Non-Af Amer)   53 L 


 


Glucose   95 


 


Calcium   9.2 


 


Total Bilirubin   1.3 


 


AST   16 L 


 


ALT   27 


 


Alkaline Phosphatase   50 


 


Total Protein   6.9 


 


Albumin   3.9 


 


Triglycerides    82


 


Cholesterol    182.54


 


LDL Cholesterol Direct    115 H


 


VLDL Cholesterol    16.4


 


HDL Cholesterol    47








 











  02/20/18 02/20/18 02/21/18





  21:30 21:30 03:35


 


Creatine Kinase  108  


 


CK-MB (CK-2)   1.44  1.64


 


Troponin I   0.136  0.168


 


NT-Pro-B Natriuret Pep    2600 H














  02/21/18 02/21/18 02/21/18





  03:35 11:11 11:11


 


Creatine Kinase  108  111 


 


CK-MB (CK-2)    1.83


 


Troponin I    0.124


 


NT-Pro-B Natriuret Pep   














  02/22/18 02/23/18





  05:06 05:31


 


Creatine Kinase  


 


CK-MB (CK-2)  


 


Troponin I  


 


NT-Pro-B Natriuret Pep  967 H  739











Impressions: 


 





Chest X-Ray  02/20/18 14:59


IMPRESSION:  Stable cardiomegaly.  No active pulmonary disease.


 














Qualifiers





- *


**PATEINT BEING DISCHARGED WITH ANY OF THE FOLLOWING DIAGNOSIS?: MI


MI Pt being discharged on Aspirin therapy?: Yes


MI Pt being discharged on Statins?: Yes


MI Pt discharged ACEI/ARBS?: Yes


HF Pt being discharged on ACEI for LVEF less than 40%?: Yes

## 2018-02-24 NOTE — PDOC PROGRESS REPORT
Subjective


Progress Note for:: 02/23/18


Subjective:: 





Patient seems to be doing better with gradual improvement.  Pt is denying any 

chest arm or neck discomfort.  Patient denying any PND, orthopnea.  Patient 

denied any sustained palpitations, dizziness, syncope, near syncope.  Patient 

denying any fever chills.  Patient denying any other significant discomfort.  





Patient is maintaining sinus rhythm.





Review of systems: Rest review of systems negative.





Medications: Medications have been reviewed.


Reason For Visit: 


HYPERTENSIVE EMERGENCY, NSTMI CAD, MULTIPLE








Physical Exam


Vital Signs: 


 











Temp Pulse Resp BP Pulse Ox


 


 98.6 F   88   18   142/77 H  100 


 


 02/23/18 15:30  02/23/18 15:30  02/23/18 15:30  02/23/18 15:30  02/23/18 15:30








 Intake & Output











 02/22/18 02/23/18 02/24/18





 06:59 06:59 06:59


 


Intake Total 767 1215 10


 


Balance 767 1215 10


 


Weight 110.2 kg 110.3 kg 











Exam: 








GENERAL: well-nourished and in no acute distress.  Alert and oriented x3


HEAD: Atraumatic, normocephalic.


EYES: Pupils equal round and reactive to light, extraocular movements intact, 

sclera anicteric, conjunctiva are normal.


ENT: TMs normal, nares patent, oropharynx clear without exudates. Moist mucous 

membranes.  No oral ulcerations or bleeding gums noted


NECK: supple without lymphadenopathy.  Trachea is central.  No cervical or 

axillary lymphadenopathy noted.  Carotids are 2+, JVD WNL 


LUNGS: Respiration seems nonlabored, no significant accessory muscle action 

noted.  Breath sounds clear to auscultation bilaterally and equal noted. No 

wheezes rales or rhonchi noted.  No significant dullness noted on percussion.


CHEST: Palpation of the chest wall shows no significant chest wall tenderness.  

No other significant abnormalities noted.  Defibrillator noted left-sided chest.


HEART: [place] NP, No PSH,  1/6 ROSA aortic area, 1/6 pan systolic murmur mitral 

area, no rubs, no gallops.


ABDOMEN: Soft, no significant tenderness appreciated, normoactive bowel sounds. 

No guarding, no rebound.  No rigidity noted . No masses appreciated.


EXTREMITIES: Pedal pulses are 1-2+, no calf tenderness noted. No clubbing or 

cyanosis.  Negative pedal edema noted


NEUROLOGICAL: Focused neurological exam showed no significant neurologic 

deficit. Normal speech, no focal weakness appreciated. 


PSYCH: Normal mood, normal affect.  Judgment and insight within normal limits.


SKIN: No significant ecchymosis, rash, ulcerations or signs of pruritus noted.


MUSCULOSKELETAL EXAM: No significant joint swelling noted.





Results


Laboratory Results: 


 





 02/23/18 05:31 





 02/23/18 05:31 





 











  02/23/18 02/23/18 02/23/18





  05:31 05:31 05:31


 


WBC  6.7  


 


RBC  5.54  


 


Hgb  16.1  


 


Hct  47.0  


 


MCV  85  


 


MCH  29.0  


 


MCHC  34.2  


 


RDW  15.3 H  


 


Plt Count  180  


 


Seg Neutrophils %  66.5  


 


Lymphocytes %  20.6  


 


Monocytes %  10.2  


 


Eosinophils %  2.2  


 


Basophils %  0.5  


 


Absolute Neutrophils  4.5  


 


Absolute Lymphocytes  1.4  


 


Absolute Monocytes  0.7  


 


Absolute Eosinophils  0.1  


 


Absolute Basophils  0.0  


 


Sodium   139.2 


 


Potassium   4.2 


 


Chloride   108 H 


 


Carbon Dioxide   21 L 


 


Anion Gap   10 


 


BUN   17 


 


Creatinine   1.36 H 


 


Est GFR ( Amer)   > 60 


 


Est GFR (Non-Af Amer)   53 L 


 


Glucose   95 


 


Calcium   9.2 


 


Total Bilirubin   1.3 


 


AST   16 L 


 


ALT   27 


 


Alkaline Phosphatase   50 


 


Total Protein   6.9 


 


Albumin   3.9 


 


Triglycerides    82


 


Cholesterol    182.54


 


LDL Cholesterol Direct    115 H


 


VLDL Cholesterol    16.4


 


HDL Cholesterol    47








 











  02/20/18 02/20/18 02/21/18





  21:30 21:30 03:35


 


Creatine Kinase  108  


 


CK-MB (CK-2)   1.44  1.64


 


Troponin I   0.136  0.168


 


NT-Pro-B Natriuret Pep    2600 H














  02/21/18 02/21/18 02/21/18





  03:35 11:11 11:11


 


Creatine Kinase  108  111 


 


CK-MB (CK-2)    1.83


 


Troponin I    0.124


 


NT-Pro-B Natriuret Pep   














  02/22/18 02/23/18





  05:06 05:31


 


Creatine Kinase  


 


CK-MB (CK-2)  


 


Troponin I  


 


NT-Pro-B Natriuret Pep  967 H  739











EKG Comments: 





Telemetry strips shows sinus rhythm.  No sustained tacky or bradycardia 

arrhythmias noted.


Impressions: 


 





Chest X-Ray  02/20/18 14:59


IMPRESSION:  Stable cardiomegaly.  No active pulmonary disease.


 














Assessment & Plan





- Diagnosis


(1) Hypertensive emergency


Is this a current diagnosis for this admission?: Yes   





(2) Chest pain


Qualifiers: 


   Chest pain type: unspecified   Qualified Code(s): R07.9 - Chest pain, 

unspecified   


Is this a current diagnosis for this admission?: Yes   





(3) NSTEMI (non-ST elevated myocardial infarction)


Is this a current diagnosis for this admission?: Yes   





(4) Dyslipidemia


Is this a current diagnosis for this admission?: Yes   





(5) Cardiac defibrillator in situ


Is this a current diagnosis for this admission?: Yes   





(6) Congestive heart failure


Is this a current diagnosis for this admission?: Yes   





(7) Coronary artery disease


Qualifiers: 


   Coronary Disease-Associated Artery/Lesion type: unspecified vessel or lesion 

type   Associated angina: angina presence unspecified 


Is this a current diagnosis for this admission?: Yes   





(8) Ischemic cardiomyopathy


Is this a current diagnosis for this admission?: Yes   





(9) Type 2 diabetes mellitus


Qualifiers: 


   Diabetes mellitus complication status: with unspecified complications   

Diabetes mellitus long term insulin use: without long term use   Qualified Code(

s): E11.8 - Type 2 diabetes mellitus with unspecified complications   


Is this a current diagnosis for this admission?: Yes   





- Notes


Notes: 





Chest pain: Currently stable.  Previous stress test report was reviewed.  

Current stress test results were discussed.  Patient tells me that he had 

coronary artery bypass graft surgery in 2005.  His last invasive cardiac 

procedure was about 2 years ago at [facility] but he had stents 

placed.  Will try to obtain those records.  Based on stress test report, have 

advised aggressive risk factor modification and medical management for the time 

being.  Discussed that it may be worthwhile to consider a heart catheterization 

should he continued to have anginal symptoms with good control of blood 

pressure.


Non-STEMI: This is based on chest pain and positive troponin I.  Probably 

brought on by supply demand mismatch from severe hypertension but cannot rule 

out acute coronary syndrome.  2D echo obtained showed LVEF seems improved from 

last echocardiogram.  Continue current medical management.  Nuclear stress test 

results reviewed with the patient.  Medical management is being recommended at 

this point


CHF: Currently compensated.  Recommend optimization of management of CHF.


Coronary artery disease: Currently stable.  Medical management is being 

optimized.


Cardiomyopathy: Patient has primary prevention single-chamber defibrillator in 

place.


Diabetes: Recommend adequate management but avoid any hypo-or hyperglycemia.


Dyslipidemia: LDL goal is less than 70 in this patient.  Will increase Lipitor 

to total of 80 mg p.o. nightly.  May add Zetia if needed.  Recommend lipid 

panel prior to discharge so that we can assess patient's compliance as 

outpatient.





- Time


Time with patient: Greater than 35 minutes - Patient was seen multiple times.  

Patient was seen after the stress test was completed and the results were 

discussed.  These results were also discussed with Dr. Ngo.  Management 

plans discussed and medications were reviewed.  Patient encouraged to follow-up 

with me.


Medications reviewed and adjusted accordingly: Yes

## 2018-05-25 NOTE — EKG REPORT
SEVERITY:- ABNORMAL ECG -

SINUS RHYTHM

LEFT ATRIAL ABNORMALITY

RBBB AND LPFB

INFERIOR INFARCT, OLD

CONSIDER ANTERIOR INFARCT

:

Confirmed by: Bryan Michele MD 25-May-2018 13:08:58

## 2018-05-25 NOTE — EKG REPORT
SEVERITY:- ABNORMAL ECG -

ATRIAL-PACED COMPLEXES

LEFT ATRIAL ABNORMALITY

RBBB AND LPFB

INFERIOR INFARCT, AGE INDETERMINATE

CONSIDER ANTERIOR INFARCT

:

Confirmed by: Bryan Michele MD 25-May-2018 13:07:34

## 2018-05-25 NOTE — RADIOLOGY REPORT (SQ)
EXAM DESCRIPTION:  CHEST SINGLE VIEW



COMPLETED DATE/TIME:  5/25/2018 10:04 am



REASON FOR STUDY:  chest pain



COMPARISON:  8/25/2016



EXAM PARAMETERS:  NUMBER OF VIEWS: One view.

TECHNIQUE: Single frontal radiographic view of the chest acquired.

RADIATION DOSE: NA

LIMITATIONS: None.



FINDINGS:  LUNGS AND PLEURA: No opacities, masses or pneumothorax. No pleural effusion.

MEDIASTINUM AND HILAR STRUCTURES: No masses.  Contour normal.

HEART AND VASCULAR STRUCTURES: Cardiac silhouette is enlarged.  Pulmonary vascular congestion is pres
ent.  There is no franklyn pulmonary edema.

BONES: No acute findings.

HARDWARE: Pacemaker/defibrillator.  Sternotomy wires.  Graft markers.

OTHER: No other significant finding.



IMPRESSION:  Cardiomegaly with pulmonary vascular congestion but no franklyn CHF.



TECHNICAL DOCUMENTATION:  JOB ID:  [id number]

 2011 [employer]- All Rights Reserved



Reading location - IP/workstation name: [email or web address]

## 2018-05-25 NOTE — ER DOCUMENT REPORT
ED Medical Screen (RME)





- General


Chief Complaint: Chest Pain


Stated Complaint: CHEST PAIN


Mode of Arrival: Ambulatory


Information source: Patient, H Records


Notes: 





65 yr old male multiple stents, CABG, recent NSTEMI in jan with normal stress 

test, no heart cath since presents with complaints of pressure like pain that 

wax and wanes since yesterday. pt took 5 nitros today with no relief . Pt of Dr Everardo FUNES have greeted and performed a rapid initial assessment of this patient.  A 

comprehensive ED assessment and evaluation of the patient, analysis of test 

results and completion of the medical decision making process will be conducted 

by additional ED providers.





PHYSICAL EXAMINATION:





GENERAL: Well-appearing, well-nourished and in no acute distress.





HEAD: Atraumatic, normocephalic.





EYES: Pupils equal round extraocular movements intact,  conjunctiva are normal.





ENT: Nares patent





NECK: Normal range of motion





LUNGS: No respiratory distress





Musculoskeletal: Normal range of motion





NEUROLOGICAL:  Normal speech, normal gait. 





PSYCH: Normal mood, normal affect.





SKIN: Warm, Dry, normal turgor, no rashes or lesions noted.


TRAVEL OUTSIDE OF THE [place] IN LAST 30 DAYS: No





- Related Data


Allergies/Adverse Reactions: 


 





Iodinated Contrast- Oral and IV Dye [IV Dye, Iodine Containing] Allergy (

Verified 08/15/15 06:29)


 











Past Medical History





- Past Medical History


Cardiac Medical History: Reports: Hx Congestive Heart Failure, Hx Coronary 

Artery Disease, Hx Heart Attack - six, Hx Hypercholesterolemia, Hx Hypertension


Pulmonary Medical History: Reports: Hx Asthma, Hx Bronchitis, Hx COPD, Hx 

Pneumonia


Neurological Medical History: Reports: Hx Cerebrovascular Accident - 2011.  

Denies: Hx Seizures


Endocrine Medical History: Reports: Hx Diabetes Mellitus Type 2


Renal/ Medical History: Reports: Hx Benign Prostatic Hyperplasia.  Denies: Hx 

Peritoneal Dialysis


GI Medical History: Reports: Hx Gastroesophageal Reflux Disease


Musculoskeltal Medical History: Denies Hx Arthritis


Psychiatric Medical History: 


   Denies: Hx Depression


Past Surgical History: Reports: Hx Abdominal Surgery - hernia, Hx Cardiac 

Catheterization, Hx Cardiac Surgery - 8 stents, bypass sx, defibrilator, Hx 

Coronary Artery Bypass Graft - x 3, Hx Coronary Stent - x 8, Hx Pacemaker, Hx 

Tonsillectomy





- Immunizations


Immunizations up to date: Yes


Hx Diphtheria, Pertussis, Tetanus Vaccination: Yes


History of Influenza Vaccine for 10/2017 - 3/2018 Season: No





Physical Exam





- Vital signs


Vitals: 





 











Temp Pulse Resp BP Pulse Ox


 


 98.3 F   83   16   176/100 H  98 


 


 05/25/18 09:24  05/25/18 09:24  05/25/18 09:24  05/25/18 09:24  05/25/18 09:24














Course





- Vital Signs


Vital signs: 





 











Temp Pulse Resp BP Pulse Ox


 


 98.3 F   83   16   176/100 H  98 


 


 05/25/18 09:24  05/25/18 09:24  05/25/18 09:24  05/25/18 09:24  05/25/18 09:24

## 2018-05-25 NOTE — EKG REPORT
SEVERITY:- ABNORMAL ECG -

SINUS RHYTHM

PROBABLE LEFT ATRIAL ABNORMALITY

RBBB AND LPFB

INFERIOR INFARCT, AGE INDETERMINATE

LATERAL INFARCT, AGE INDETERMINATE

CONSIDER ANTERIOR INFARCT

:

Confirmed by: Bryan Michele MD 25-May-2018 18:21:03

## 2018-05-25 NOTE — ER DOCUMENT REPORT
ED General





- General


Chief Complaint: Chest Pain


Stated Complaint: CHEST PAIN


Time Seen by Provider: 05/25/18 09:38


Mode of Arrival: Ambulatory


TRAVEL OUTSIDE OF THE [place] IN LAST 30 DAYS: No





- HPI


Notes: 





Patient is a 65-year-old male with a history of hypertension, coronary artery 

disease and triple bypass (NSTEMI in January), AICD, type 2 DM who presents to 

the ED complaining of chest pain intermittently over the last 2 days.  Patient 

states that his chest pain does not radiate.  His last episode of chest pain 

was about 7 hours ago.  Patient states that he took multiple doses of nitro 

which did improve his symptoms.  Patient states that he no longer has any chest 

pain, but wanted to come get checked.  Patient states that his last stress test 

was in March about 2 months ago and was unremarkable at that time.  He is 

otherwise eating and drinking without any difficulties.  He is urinating 

normally and having normal bowel movements.  Denies any headache, fever, neck 

pain, URI, sore throat, palpitations, syncope, cough, shortness of breath, 

wheeze, dyspnea, abdominal pain, nausea/vomiting/diarrhea, urinary retention, 

dysuria, hematuria, or rash.








- Related Data


Allergies/Adverse Reactions: 


 





Iodinated Contrast- Oral and IV Dye [IV Dye, Iodine Containing] Allergy (

Verified 08/15/15 06:29)


 











Past Medical History





- General


Information source: Patient, [facility]





- Social History


Smoking Status: Former Smoker


Chew tobacco use (# tins/day): No


Frequency of alcohol use: None


Drug Abuse: None


Family History: Hypertension.  denies: CAD


Patient has suicidal ideation: No


Patient has homicidal ideation: No





- Past Medical History


Cardiac Medical History: Reports: Hx Congestive Heart Failure, Hx Coronary 

Artery Disease, Hx Heart Attack - six, Hx Hypercholesterolemia, Hx Hypertension


Pulmonary Medical History: Reports: Hx Asthma, Hx Bronchitis, Hx COPD, Hx 

Pneumonia


Neurological Medical History: Reports: Hx Cerebrovascular Accident - 2011.  

Denies: Hx Seizures


Endocrine Medical History: Reports: Hx Diabetes Mellitus Type 2


Renal/ Medical History: Reports: Hx Benign Prostatic Hyperplasia.  Denies: Hx 

Peritoneal Dialysis


GI Medical History: Reports: Hx Gastroesophageal Reflux Disease


Musculoskeltal Medical History: Denies Hx Arthritis


Psychiatric Medical History: 


   Denies: Hx Depression


Past Surgical History: Reports: Hx Abdominal Surgery - hernia, Hx Cardiac 

Catheterization, Hx Cardiac Surgery - 8 stents, bypass sx, defibrilator, Hx 

Cholecystectomy, Hx Coronary Artery Bypass Graft - x 3, Hx Coronary Stent - x 8

, Hx Pacemaker, Hx Tonsillectomy





- Immunizations


Immunizations up to date: Yes


Hx Diphtheria, Pertussis, Tetanus Vaccination: Yes


Hx Pneumococcal Vaccination: 08/18/14





Review of Systems





- Review of Systems


-: Yes All other systems reviewed and negative





Physical Exam





- Vital signs


Vitals: 


 











Temp Pulse Resp BP Pulse Ox


 


 98.3 F   83   16   176/100 H  98 


 


 05/25/18 09:24  05/25/18 09:24  05/25/18 09:24  05/25/18 09:24  05/25/18 09:24














- Notes


Notes: 





PHYSICAL EXAMINATION:





GENERAL: Well-appearing, well-nourished and in no acute distress.





EYES: Pupils equal round and reactive to light, extraocular movements intact, 

sclera anicteric, conjunctiva are normal.





ENT:  Nares patent and without discharge.  oropharynx clear without exudates.  

No tonsilar hypertrophy or erythema.  Moist mucous membranes. 





NECK: Normal range of motion, supple without lymphadenopathy





Chest:  + mild tenderness which correlates with pain described.  no flail chest.





LUNGS: Breath sounds clear to auscultation bilaterally and equal.  No wheezes 

rales or rhonchi.





HEART: Regular rate and rhythm without murmurs, rubs, gallops.





ABDOMEN: Soft, nontender, nondistended abdomen.  No guarding, no rebound.  No 

masses appreciated.  Normal bowel sounds present.  No CVA tenderness 

bilaterally.





Musculoskeletal: FROM to passive/active. Strength 5+/5. Dario neg.  





Extremities:  No cyanosis, clubbing, or edema b/l.  Peripheral pulses 2+.  

Capillary refill less than 3 seconds.





NEUROLOGICAL:  Normal speech, normal gait. 





PSYCH: Normal mood, normal affect.





SKIN: Warm, Dry, normal turgor, no rashes or lesions noted.





Course





- Re-evaluation


Re-evalutation: 





05/25/18 14:42


Recheck on patient.


No new concerns or complaints.


No chest pain.


Tolerating PO. Just took his mid-day hydralazine 25mg.


2nd trop ordered.





05/25/18 16:34


Patient is an afebrile, well-hydrated, 65-year-old male who presents to the ED 

with chest wall pain and resolved chest pain.  Vitals are acceptable.  PE is 

otherwise unremarkable.  Patient has no significant tachycardia, tachypnea, or 

hypoxia.  He is nontoxic-appearing.  Patient is currently asymptomatic.  He is 

tolerating p.o. without difficulties.  Lungs are clear to auscultation 

bilaterally otherwise.  CBC, CMP, cardiac enzymes 2/EKG were unremarkable for 

any acute pathology.  Chest x-ray showed some pulmonary vascular congestion 

with a BNP of 1230.  Patient did receive Lasix and his hydralazine today.  

Patient has a heart score of 5.  I did review this with his PCM Dr. Thomson 

who told me to call Dr. Rey as it is close to 5pm.  Spoke with Dr. Rey who refused this admission and stated that this patient can now go 

home.  I did emphasize that this patient has a heart score of 5 and reviewed 

his labs, but he was persistent that this patient can go home.  Reviewed with 

patient who is in agreement at this time, but advised strict return 

precautions.  Patient is aware of the risk and benefit of going home versus 

admission and his understanding that he could have a heart attack and die.  

Advised recheck with his PCM on Tuesday as Monday is a holiday.  Return to the 

ED with any worsening/concerning symptoms otherwise as reviewed discharge.  

Patient is in agreement.








- Vital Signs


Vital signs: 


 











Temp Pulse Resp BP Pulse Ox


 


 98.3 F   83   18   179/106 H  97 


 


 05/25/18 09:24  05/25/18 09:24  05/25/18 13:01  05/25/18 13:00  05/25/18 13:01














- Laboratory


Result Diagrams: 


 05/25/18 10:46





 05/25/18 12:13


Laboratory results interpreted by me: 


 











  05/25/18 05/25/18 05/25/18





  10:46 12:13 12:13


 


RBC  6.03 H  


 


Hgb  17.3 H  


 


Hct  51.7 H  


 


RDW  15.3 H  


 


Chloride    109 H


 


Direct Bilirubin    0.5 H


 


NT-Pro-B Natriuret Pep   1230 H 














Discharge





- Discharge


Clinical Impression: 


 Chest wall pain





Chest pain, unspecified


Qualifiers:


 Chest pain type: unspecified Qualified Code(s): R07.9 - Chest pain, unspecified





Condition: Stable


Disposition: HOME, SELF-CARE


Instructions:  Chest Wall Pain ([facility]), Chest Pain of Unclear Cause ([facility])


Additional Instructions: 


Maintain adequate fluid and food intake


Take home medications as directed


Low sodium/fat diet


Exercise regularly


Weight control


Monitor blood pressure daily and keep a log


Monitor symptoms for any acute changes


Recheck with your PCM in 3-5 days


Consider a follow-up with cardiology 


Return to the ED with any worsening symptoms and/or development of fever, 

headache, chest pain, palpitations, syncope, shortness of breath, trouble 

breathing, abdominal pain, n/v/d, blood in stool/urine, loss of control of bowel

/bladder, urinary retention, muscle weakness/paralysis, numbness/tingling, or 

other worsening symptoms that are concerning to you.


Forms:  Elevated Blood Pressure


Referrals: 


PREETI THOMSON MD [Primary Care Provider] - 05/29/18

## 2018-07-10 NOTE — OPERATIVE REPORT
Operative Report


DATE OF SURGERY: 07/10/18


Operative Report: 





Pre-op diagnosis: Colon cancer screening





Post-op diagnosis:


1.  Polyps in the ascending, transverse, sigmoid colon and the rectum


2.  Pancolonic diverticulosis





Surgery: Colonoscopy with polypectomy and biopsy





Medications: Versed mg,   Fentanyl  mcg IV push





Tissue removed: 





Procedure: After informed consent obtained from patient, conscious sedation was 

achieved.  A digital rectal examination was performed and this was 

unremarkable.  The colonoscope was inserted into the rectum and advanced to the 

cecum.  The appendiceal orifice and the terminal ileum were both identified.  

The mucosa was examined into details as the colonoscope was slowly pulled out 

of the patient.  The endoscope was retroflexed in the rectum. Patient tolerated 

the procedure well.





Findings





Multiple diverticuli were noted all over the colon


Cecum: Normal


Ascending colon: 2 mm polyp removed with the biopsy forceps


Transverse colon: 4 polyps were removed from the proximal and the mid 

transverse colon with a hot polypectomy snare


Descending colon: Normal


Sigmoid colon: Three 5-6mm polyps were removed with a hot snare


Rectum: A 5 mm polyp was removed with a hot snare.  Patient also had internal 

hemorrhoids.








Plan: Await pathology.  Repeat colonoscopy in 3-5 years


OPERATION: .

## 2019-08-06 NOTE — RADIOLOGY REPORT (SQ)
EXAM DESCRIPTION:  U/S RETROPERITON (RENAL/AORTA)



COMPLETED DATE/TIME:  8/6/2019 3:17 pm



REASON FOR STUDY:  N18.3 CHRONIC KIDNEY DISEASE, STAGE 3 (MODERATE) N18.3  CHRONIC KIDNEY DISEASE, ST
AGE 3 (MODERATE)



COMPARISON:  None.



TECHNIQUE:  Dynamic and static grayscale images acquired of the kidneys and bladder and recorded on P
ACS. Additional selected color Doppler and spectral images recorded.



LIMITATIONS:  None.



FINDINGS:  RIGHT KIDNEY: Normal size. Normal echogenicity. No solid or suspicious masses. No hydronep
hrosis. No calcifications.

LEFT KIDNEY:  Normal size. Normal echogenicity. No solid or suspicious masses. No hydronephrosis. No 
calcifications.  There is a 3.7 x 3.6 x 3.6 cm simple cyst.

BLADDER: No masses.

OTHER FINDINGS: No other significant finding.



IMPRESSION:  Simple left renal cyst.  No other significant findings.



TECHNICAL DOCUMENTATION:  JOB ID:  [id number]

 2011 [employer]- All Rights Reserved



Reading location - IP/workstation name: [email or web address]